# Patient Record
Sex: FEMALE | Race: WHITE | NOT HISPANIC OR LATINO | Employment: UNEMPLOYED | URBAN - METROPOLITAN AREA
[De-identification: names, ages, dates, MRNs, and addresses within clinical notes are randomized per-mention and may not be internally consistent; named-entity substitution may affect disease eponyms.]

---

## 2018-09-05 ENCOUNTER — NON-PROVIDER VISIT (OUTPATIENT)
Dept: NEUROLOGY | Facility: MEDICAL CENTER | Age: 33
End: 2018-09-05
Payer: MEDICAID

## 2018-09-05 DIAGNOSIS — G40.909 NONINTRACTABLE EPILEPSY WITHOUT STATUS EPILEPTICUS, UNSPECIFIED EPILEPSY TYPE (HCC): ICD-10-CM

## 2018-09-05 PROCEDURE — 95816 EEG AWAKE AND DROWSY: CPT | Performed by: PSYCHIATRY & NEUROLOGY

## 2018-09-10 NOTE — PROCEDURES
DATE OF SERVICE:  09/05/2018    This is an outpatient EEG, done on 09/05/2018.    ROUTINE ELECTROENCEPHALOGRAM REPORT        NAME: Nenita Gomesyl     REFERRING Dr: Ingris     DURATION: 25 minutes     INDICATION: EEG for pt who reports has a seizure disorder since 18 years of age but well controlled until more recently experiencing events of convulsing and LOC duirng menstrual cycle. Pt also reports daily body twitches.   HX seizure , HTN        TECHNIQUE: 30 channel routine electroencephalogram (EEG) was performed in accordance with the international 10-20 system. The study was reviewed in bipolar and referential montages. The recording examined the patient during wakeful and drowsy state(s).      DESCRIPTION OF THE RECORD:        Background rhythm during awake stage shows well-organized, well-developed, average voltage 10 to 11 hertz alpha activity in the posterior regions.  It blocks with eye opening and it is bilaterally synchronous and symmetrical.  No spike-and-wave discharges but short runs of delta lateralizing abnormalities over left are seen.  Photic stimulation and HV did not produce any abnormalities. No abnormalities were found during the procedure. Stage I sleep was achieved.        ACTIVATION PROCEDURES:       HV and Photic Stimulation were done     ICTAL AND/OR INTERICTAL FINDINGS:    No focal or generalized epileptiform activity noted. No regional slowing was seen during this routine study.  No clinical events or seizures were reported or recorded during the study.       EKG: sampling of the EKG recording demonstrated sinus rhythm.          INTERPRETATION:        ________________________________________________________________________     This is mildly abnormal routine EEG recording in the awake and drowsy/sleep state(s).     This scalp EEG shows mild focal cortical dysfunction / irritabiltiy over left temporal     Of note, unremarkable EEG does not completely exclude the diagnosis  of seizures  since seizure is an episodic phenomena and frontal lobe seizures could have normal scalp EEG. Clinical correlation may help     If clinical suspicion of seizure remains high.  Prolonged outpatient EEG   monitoring may be of help.        ________________________________________________________________________                  ____________________________________     YEN-MD ANA ACEVES / BRIGIDO    DD:  09/09/2018 23:10:53  DT:  09/09/2018 23:16:47    D#:  [id number]  Job#:  [id number]

## 2018-09-10 NOTE — PROGRESS NOTES
ROUTINE ELECTROENCEPHALOGRAM REPORT      NAME: Beata Gomes    REFERRING Dr: Ingris    DURATION: 25 minutes    INDICATION: EEG for pt who reports has a seizure disorder since 18 years of age but well controlled until more recently experiencing events of convulsing and LOC duirng menstrual cycle. Pt also reports daily body twitches.   HX seizure , HTN      TECHNIQUE: 30 channel routine electroencephalogram (EEG) was performed in accordance with the international 10-20 system. The study was reviewed in bipolar and referential montages. The recording examined the patient during wakeful and drowsy state(s).     DESCRIPTION OF THE RECORD:      Background rhythm during awake stage shows well-organized, well-developed, average voltage 10 to 11 hertz alpha activity in the posterior regions.  It blocks with eye opening and it is bilaterally synchronous and symmetrical.  No spike-and-wave discharges but short runs of delta lateralizing abnormalities over left are seen.  Photic stimulation and HV did not produce any abnormalities. No abnormalities were found during the procedure. Stage I sleep was achieved.      ACTIVATION PROCEDURES:      HV and Photic Stimulation were done    ICTAL AND/OR INTERICTAL FINDINGS:    No focal or generalized epileptiform activity noted. No regional slowing was seen during this routine study.  No clinical events or seizures were reported or recorded during the study.      EKG: sampling of the EKG recording demonstrated sinus rhythm.        INTERPRETATION:      ________________________________________________________________________    This is mildly abnormal routine EEG recording in the awake and drowsy/sleep state(s).    This scalp EEG shows mild focal cortical dysfunction / irritabiltiy over left temporal    Of note, unremarkable EEG does not completely exclude the diagnosis  of seizures since seizure is an episodic phenomena and frontal lobe seizures could have normal scalp EEG. Clinical  correlation may help     If clinical suspicion of seizure remains high.  Prolonged outpatient EEG   monitoring may be of help.      ________________________________________________________________________

## 2019-01-09 ENCOUNTER — OFFICE VISIT (OUTPATIENT)
Dept: NEUROLOGY | Facility: MEDICAL CENTER | Age: 34
End: 2019-01-09
Payer: MEDICAID

## 2019-01-09 VITALS
WEIGHT: 234.8 LBS | BODY MASS INDEX: 35.58 KG/M2 | SYSTOLIC BLOOD PRESSURE: 124 MMHG | RESPIRATION RATE: 18 BRPM | HEIGHT: 68 IN | TEMPERATURE: 98.3 F | OXYGEN SATURATION: 96 % | HEART RATE: 89 BPM | DIASTOLIC BLOOD PRESSURE: 80 MMHG

## 2019-01-09 DIAGNOSIS — G40.409 MYOCLONIC SEIZURE DISORDER (HCC): ICD-10-CM

## 2019-01-09 DIAGNOSIS — Z91.89 AT RISK FOR OSTEOPENIA: ICD-10-CM

## 2019-01-09 DIAGNOSIS — F32.A DEPRESSION, UNSPECIFIED DEPRESSION TYPE: ICD-10-CM

## 2019-01-09 DIAGNOSIS — F17.200 SMOKER: ICD-10-CM

## 2019-01-09 PROCEDURE — 99205 OFFICE O/P NEW HI 60 MIN: CPT | Performed by: NURSE PRACTITIONER

## 2019-01-09 RX ORDER — DIVALPROEX SODIUM 500 MG/1
500 TABLET, DELAYED RELEASE ORAL 2 TIMES DAILY
Qty: 60 TAB | Refills: 5 | Status: SHIPPED | OUTPATIENT
Start: 2019-01-09 | End: 2019-06-19 | Stop reason: SDUPTHER

## 2019-01-09 RX ORDER — CARBAMAZEPINE 400 MG/1
400 TABLET, EXTENDED RELEASE ORAL 3 TIMES DAILY
COMMUNITY
Start: 2018-12-02 | End: 2019-01-09 | Stop reason: SDUPTHER

## 2019-01-09 RX ORDER — LORAZEPAM 1 MG/1
TABLET ORAL
Qty: 20 TAB | Refills: 0 | Status: SHIPPED | OUTPATIENT
Start: 2019-01-09 | End: 2019-07-09

## 2019-01-09 RX ORDER — CARBAMAZEPINE 400 MG/1
400 TABLET, EXTENDED RELEASE ORAL 3 TIMES DAILY
Qty: 90 TAB | Refills: 0 | Status: SHIPPED | OUTPATIENT
Start: 2019-01-09 | End: 2019-06-19

## 2019-01-09 RX ORDER — BUPROPION HYDROCHLORIDE 75 MG/1
75 TABLET ORAL DAILY
COMMUNITY
Start: 2018-12-24 | End: 2019-06-19

## 2019-01-09 ASSESSMENT — PATIENT HEALTH QUESTIONNAIRE - PHQ9
CLINICAL INTERPRETATION OF PHQ2 SCORE: 3
5. POOR APPETITE OR OVEREATING: 3 - NEARLY EVERY DAY
SUM OF ALL RESPONSES TO PHQ QUESTIONS 1-9: 15

## 2019-01-09 NOTE — PATIENT INSTRUCTIONS
Depakote 500mg tablets      AM            PM   0 tablet -- 1/2 tablet X 5 days   0 tablet -- 1 tablet X 5 days  1/2 tablet- 1 tablet X 1 week    1 tablet - 1 tablet thereafter      Carbamezapine XR 400mg tablet   AM        Afternoon     Bedtime   1 tab -- 1/2 tablet -- 1 tablet X 5 days   1 tab --   0            -- 1 tablet X 5 days  1/2 tab-   0             -- 1 tablet X 1 week     0---------------------- 1 tablet X 1 week     0---------------------- 1/2 tab X 1 week    Then  Stop.    Start prenatal vitamin daily.

## 2019-01-09 NOTE — PROGRESS NOTES
Subjective:      Beata Gomes is a 33 y.o. female who presents with New Patient (Seizures)        Referred by PCP to our neurology office.    Last seen in childhood per neurology.    HPI   History of first jerks at age 18.  Was pouring cereal into a bowl and threw the bowl over her head.  About 4 months later, she had a GTC.     Referred to Dr Morgan, started a different AED-- she knows that her jerks got worse and really no side effects.    Drop out from high school due to the seizures, now has a GED now.    Good is having jerks every day without a convulsion.    Has a very sporadic work history due to seizures.    Had a very difficult few years with drinking and depressed.  First baby at age 23-- fetal demise at 36 weeks.  Second child at age 29, baby girl.    Last job was at the [employer].    Last known convulsion was in October 2018, morning hours.    Triggers: stress, poor sleep, menstrual period.  Typically has jerks in the morning when has poor sleep.  If she doesn't nap, in the afternoon, she will jerk a lot in the evening.    Lives in [place], [place] with parents.  Mother with genetic based epilepsy.    [place].  Does not work or drive.    Wellbutrin-- quit smoking for 3 months then resumed.    9/2018: EEG per Dr Persaud  This is mildly abnormal routine EEG recording in the awake and drowsy/sleep state(s).     This scalp EEG shows mild focal cortical dysfunction / irritabiltiy over left temporal    Current Outpatient Prescriptions   Medication Sig Dispense Refill   • carBAMazepine SR (TEGRETOL XR) 400 MG TABLET SR 12 HR Take 400 mg by mouth 3 times a day.     • metoprolol (LOPRESSOR) 25 MG Tab Take 25 mg by mouth 2 Times a Day.     • buPROPion (WELLBUTRIN) 75 MG Tab Take 75 mg by mouth every day.     • Prenatal Vit-FePoly-FA-DHA 27-1-200 MG CAPS Take 1 Cap by mouth every day. (Patient not taking: Reported on 1/9/2019) 30 Cap 8   • metronidazole (FLAGYL) 500 MG TABS Take 1 Tab by mouth 2 Times a Day. (Patient not  "taking: Reported on 1/9/2019) 14 Tab 0   • Prenatal MV-Min-Fe Fum-FA-DHA (PRENATAL 1 PO) Take  by mouth.     • nitrofurantoin monohydr macro (MACROBID) 100 MG CAPS Take 1 Cap by mouth 2 times a day. (Patient not taking: Reported on 1/9/2019) 20 Cap 0     No current facility-administered medications for this visit.        Review of Systems   Constitutional: Negative.    HENT: Negative for hearing loss, nosebleeds and sore throat.         No recent head injury.   Eyes: Negative for double vision.        No new loss of vision.   Respiratory: Negative for cough.         No recent lung infections.   Cardiovascular: Negative for chest pain.   Gastrointestinal: Negative for abdominal pain, diarrhea, nausea and vomiting.   Genitourinary: Negative.    Musculoskeletal: Negative.    Skin: Negative.    Neurological: Positive for seizures. Negative for headaches.   Endo/Heme/Allergies:        No history of endocrine dysfunction.  No new problems.   Psychiatric/Behavioral: Negative for depression. The patient is not nervous/anxious.         No recent mood changes.          Objective:     /80   Pulse 89   Temp 36.8 °C (98.3 °F)   Resp 18   Ht 1.727 m (5' 8\")   Wt 106.5 kg (234 lb 12.8 oz)   SpO2 96%   BMI 35.70 kg/m²      Physical Exam   Constitutional: She is oriented to person, place, and time. She appears well-developed and well-nourished. No distress.   HENT:   Head: Normocephalic and atraumatic.   Nose: Nose normal.   Eyes: Pupils are equal, round, and reactive to light.   Neck: Normal range of motion.   Cardiovascular: Normal rate and regular rhythm.  Exam reveals no gallop and no friction rub.    No murmur heard.  Pulmonary/Chest: Effort normal and breath sounds normal. No respiratory distress.   Lymphadenopathy:     She has no cervical adenopathy.   Neurological: She is alert and oriented to person, place, and time. Gait normal.   CN II: Fundi normal, visual fields full to confrontation.  CN III, IV, VI: " Pupils equal, round, and reactive to light.  Extraocular movements full and intact in horizontal and vertical gaze.  CN V: Normal in motor and sensory modalities.  CN VII: No evidence of facial asymmetry.  CN VIII: Hearing grossly intact.  CN IX, X: Palate elevates symmetrically and in the midline.  CN XI: Normal sternocleidomastoid strength.  CN XII: Tongue is in the midline.    Motor: Normal muscle bulk and tone, with full and symmetric strength.  Sensory: Intact to light touch, pinprick, vibration, proprioception, and graphesthesia.  DTR's: 2+ throughout with flexor plantar responses.  Cerebellar/Coordination: Normal finger to finger, finger-tapping, rapid alternating movements, and foot tapping.  Gait: Normal casual gait.  Walks well on heels and toes, as well as in tandem gait.   Skin: Skin is warm and dry.   Psychiatric: She has a normal mood and affect.             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    Prescribed CBZ for many years.    History of mood disorder, depression, treated with Wellbutrin currently.    Jerks primarily triggered by lack of sleep.  Last concern for GTC was October 2018.    Neurological examination is normal and non-focal.    I have multiple concerns for Ms Gomes at this time    Carbamezapine may in fact be worsening her epilepsy.  AED of choice is Depakote followed by Keppra at this time.  Lengthy conversation regarding side effects potentiated with Depakote.  She has an IUD in place.  Struggle with depression and anxiety.     Discussed the unlikely possibility of liver failure.  We discussed the possibility of thrombocytopenia, weight gain, and mood alteration.  And the need for monthly labs for the first 6 months on Depakote.     Depakote 500mg tablets      AM            PM   0 tablet -- 1/2 tablet X 5 days   0 tablet -- 1 tablet X 5 days  1/2 tablet- 1 tablet X 1 week    1 tablet - 1 tablet  thereafter      Carbamezapine XR 400mg tablet   AM        Afternoon     Bedtime   1 tab -- 1/2 tablet -- 1 tablet X 5 days   1 tab --   0            -- 1 tablet X 5 days  1/2 tab-   0             -- 1 tablet X 1 week     0---------------------- 1 tablet X 1 week     0---------------------- 1/2 tab X 1 week  Then Stop.    Start prenatal vitamin daily.  Must always use 2 forms of birth control while on Depakote.    I am concerned about the choice of Wellbutrin- it was initiated with the goal to curb her nicotine craving which it has not.  I ask them to consider her weaning off of the Wellbutrin per the ordering physician instruction.    New Rx for ativan 1mg tablet prescribed and discussed appropriate usage thereof.    Discussed general safety at length, she does not drive but would very much like to in the future.  Reviewed water and cooking safety.    Obtain 24-hour aEEG to evaluate for subclinical seizures.    Discussed DEXA and/or MRI in the future.    To stay in close telephone contact.    Return for follow-up after EEG to discuss results.   I spent 60+ minutes with this patient, over fifty percent was spent counseling patient on their condition, best management practices, reviewing test results and risks and benefits of treatment.      EDUCATION AND COUNSELING:  -Education was provided to the patient and/or family regarding diagnosis and prognosis. The chronic and unpredictable nature of the condition was discussed. There is increased risk for additional events, which may carry potential for significant injuries and death.    -We reviewed the current antiepileptic regimen. Potential side effects of antiepileptics were discussed at length, including but no limited to: hypersensitivity reactions (rash and others, some of which can be fatal), visual field changes (some of which may be irreversible), glaucoma, diplopia, kidney stones, osteopenia/osteoporosis/bone fractures, hyperthermia/anhydrosis, tremors, ataxia,  dizziness, fatigue, increased risk for falls, cardiac arrhythmias/syncope, gastrointestinal (hepatitis, pancreatitis, gastritis, ulcers), gingival hypertrophy, drowsiness, sedation, anxiety/nervousness, increased risk for suicide, increased risk for depression, and psychosis. We reviewed drug-drug interactions and their potential effect on seizure control and medication side effects.    -Patient/family educated on SUDEP (Sudden Death in Epilepsy). Counseling was provided on the importance of strict medication and follow up compliance. The patient understands the risks associated with non-adherence with the medical plan as outlined, including but not limited to an increased risk for breakthrough seizures, which may contribute to injuries, disability, status epilepticus, and even death.    -Counseling was also provided on potential effects of alcohol and other drugs, which may lower seizure threshold and/or affect the metabolism of antiepileptic drugs. I recommend avoidance of alcohol and illegal drugs.  -Recommend proper hydration, regular exercise, proper sleep hygiene (7-8 hrs of overnight sleep, avoid sleep deprivation).    -I have made the patient aware of mandatory reporting required by the law in the State of [place] regarding episodes of seizures, loss of consciousness, and/or alteration of awareness. The patient and family are responsible for reporting events to the DMV, instructions were provided. The patient verbalized understanding and states he has not been driving. Other seizure precautions were discussed at length, including no diving, no skydiving, no unsupervised swimming, no Jacuzzi or bathing in bathtubs.    Pt agrees with plan.

## 2019-01-11 ENCOUNTER — TELEPHONE (OUTPATIENT)
Dept: NEUROLOGY | Facility: MEDICAL CENTER | Age: 34
End: 2019-01-11

## 2019-01-11 DIAGNOSIS — G40.909 SEIZURE DISORDER (HCC): ICD-10-CM

## 2019-01-11 NOTE — TELEPHONE ENCOUNTER
Please put an addendum for patient's EEG order, needs to say 24 hour. EEG scheduling is unable to do so on their end. Thanks,

## 2019-01-14 PROBLEM — F32.A DEPRESSION: Status: ACTIVE | Noted: 2019-01-14

## 2019-01-14 PROBLEM — Z91.89 AT RISK FOR OSTEOPENIA: Status: ACTIVE | Noted: 2019-01-14

## 2019-01-14 PROBLEM — G40.409 MYOCLONIC SEIZURE DISORDER (HCC): Status: ACTIVE | Noted: 2019-01-14

## 2019-01-14 ASSESSMENT — ENCOUNTER SYMPTOMS
SORE THROAT: 0
DIARRHEA: 0
HEADACHES: 0
COUGH: 0
SEIZURES: 1
DEPRESSION: 0
MUSCULOSKELETAL NEGATIVE: 1
ABDOMINAL PAIN: 0
CONSTITUTIONAL NEGATIVE: 1
DOUBLE VISION: 0
VOMITING: 0
NERVOUS/ANXIOUS: 0
NAUSEA: 0

## 2019-04-17 ENCOUNTER — TELEPHONE (OUTPATIENT)
Dept: NEUROLOGY | Facility: MEDICAL CENTER | Age: 34
End: 2019-04-17

## 2019-04-17 NOTE — TELEPHONE ENCOUNTER
Please let Beata know that her Vit D level is low-- recommend taking Vit D 5000IU per day.      Also, how are her seizures?

## 2019-05-03 NOTE — TELEPHONE ENCOUNTER
[place] back from patient today she understood to add Vitamin D to her daily medications.     She stated that her seizures are much better and is feeling good.     Confirmed appt for her AEEG on May 22.

## 2019-05-23 ENCOUNTER — NON-PROVIDER VISIT (OUTPATIENT)
Dept: NEUROLOGY | Facility: MEDICAL CENTER | Age: 34
End: 2019-05-23
Payer: MEDICAID

## 2019-05-23 DIAGNOSIS — G40.909 SEIZURE DISORDER (HCC): ICD-10-CM

## 2019-05-23 PROCEDURE — 95816 EEG AWAKE AND DROWSY: CPT | Performed by: PSYCHIATRY & NEUROLOGY

## 2019-06-19 ENCOUNTER — OFFICE VISIT (OUTPATIENT)
Dept: NEUROLOGY | Facility: MEDICAL CENTER | Age: 34
End: 2019-06-19
Payer: MEDICAID

## 2019-06-19 VITALS
DIASTOLIC BLOOD PRESSURE: 72 MMHG | HEART RATE: 76 BPM | TEMPERATURE: 97.9 F | SYSTOLIC BLOOD PRESSURE: 124 MMHG | WEIGHT: 273 LBS | HEIGHT: 68 IN | BODY MASS INDEX: 41.37 KG/M2 | OXYGEN SATURATION: 97 %

## 2019-06-19 DIAGNOSIS — Z91.89 AT RISK FOR OSTEOPENIA: ICD-10-CM

## 2019-06-19 DIAGNOSIS — F32.9 REACTIVE DEPRESSION: ICD-10-CM

## 2019-06-19 DIAGNOSIS — G40.409 MYOCLONIC SEIZURE DISORDER (HCC): ICD-10-CM

## 2019-06-19 PROCEDURE — 99214 OFFICE O/P EST MOD 30 MIN: CPT | Performed by: NURSE PRACTITIONER

## 2019-06-19 RX ORDER — DIVALPROEX SODIUM 500 MG/1
500 TABLET, DELAYED RELEASE ORAL 2 TIMES DAILY
Qty: 60 TAB | Refills: 5 | Status: SHIPPED | OUTPATIENT
Start: 2019-06-19 | End: 2019-12-30

## 2019-06-19 ASSESSMENT — ENCOUNTER SYMPTOMS
DEPRESSION: 1
NAUSEA: 0
CONSTITUTIONAL NEGATIVE: 1
COUGH: 0
DIARRHEA: 0
SEIZURES: 0
SORE THROAT: 0
ABDOMINAL PAIN: 0
HEADACHES: 0
VOMITING: 0
MUSCULOSKELETAL NEGATIVE: 1
DOUBLE VISION: 0
NERVOUS/ANXIOUS: 0

## 2019-06-24 ENCOUNTER — TELEPHONE (OUTPATIENT)
Dept: NEUROLOGY | Facility: MEDICAL CENTER | Age: 34
End: 2019-06-24

## 2019-06-24 DIAGNOSIS — G40.909 SEIZURE DISORDER (HCC): ICD-10-CM

## 2019-06-24 DIAGNOSIS — R21 RASH IN ADULT: ICD-10-CM

## 2019-06-24 NOTE — TELEPHONE ENCOUNTER
Patient started having a rash about 2 1/2 months ago on her stomach and she believes it is from the dilantin. She said that it flares up gets red and peels, she takes benadryl for it but is wondering I there is anything else she can do or use for it as it is really becoming bothersome for her.     Please advise.

## 2019-06-28 NOTE — TELEPHONE ENCOUNTER
Called patient and LVM stating to see PCP urgently and to have labs done, fasting. Asked for her to call back to see if she would like labs faxed to a lab closer to her.

## 2019-07-25 ENCOUNTER — TELEPHONE (OUTPATIENT)
Dept: NEUROLOGY | Facility: MEDICAL CENTER | Age: 34
End: 2019-07-25

## 2019-07-29 ENCOUNTER — TELEPHONE (OUTPATIENT)
Dept: NEUROLOGY | Facility: MEDICAL CENTER | Age: 34
End: 2019-07-29

## 2019-12-30 ENCOUNTER — APPOINTMENT (OUTPATIENT)
Dept: NEUROLOGY | Facility: MEDICAL CENTER | Age: 34
End: 2019-12-30
Payer: MEDICAID

## 2020-01-29 ENCOUNTER — OFFICE VISIT (OUTPATIENT)
Dept: NEUROLOGY | Facility: MEDICAL CENTER | Age: 35
End: 2020-01-29
Payer: MEDICAID

## 2020-01-29 VITALS
HEART RATE: 75 BPM | SYSTOLIC BLOOD PRESSURE: 122 MMHG | TEMPERATURE: 98.4 F | WEIGHT: 263 LBS | HEIGHT: 68 IN | BODY MASS INDEX: 39.86 KG/M2 | DIASTOLIC BLOOD PRESSURE: 78 MMHG | RESPIRATION RATE: 16 BRPM | OXYGEN SATURATION: 98 %

## 2020-01-29 DIAGNOSIS — F32.9 REACTIVE DEPRESSION: ICD-10-CM

## 2020-01-29 DIAGNOSIS — Z91.89 AT RISK FOR OSTEOPENIA: ICD-10-CM

## 2020-01-29 DIAGNOSIS — G40.409 MYOCLONIC SEIZURE DISORDER (HCC): ICD-10-CM

## 2020-01-29 PROCEDURE — 99213 OFFICE O/P EST LOW 20 MIN: CPT | Performed by: NURSE PRACTITIONER

## 2020-01-29 RX ORDER — VITAMIN A, VITAMIN C, VITAMIN D, VITAMIN E, THIAMINE, RIBOFLAVIN, NIACIN, VITAMIN B6, FOLIC ACID, VITAMIN B12, CALCIUM, IRON, ZINC, COPPER 4000; 120; 400; 22; 1.84; 3; 20; 10; 1; 12; 200; 27; 25; 2 [IU]/1; MG/1; [IU]/1; [IU]/1; MG/1; MG/1; MG/1; MG/1; MG/1; UG/1; MG/1; MG/1; MG/1; MG/1
1 TABLET ORAL
COMMUNITY
Start: 2020-01-08 | End: 2021-03-15

## 2020-01-29 RX ORDER — DIVALPROEX SODIUM 500 MG/1
TABLET, DELAYED RELEASE ORAL
Qty: 60 TAB | Refills: 3 | Status: SHIPPED | OUTPATIENT
Start: 2020-01-29 | End: 2020-06-05

## 2020-01-29 ASSESSMENT — ENCOUNTER SYMPTOMS
DEPRESSION: 0
SEIZURES: 0
HEADACHES: 0
DIARRHEA: 0
COUGH: 0
NERVOUS/ANXIOUS: 0
VOMITING: 0
ABDOMINAL PAIN: 0
DOUBLE VISION: 0
NAUSEA: 0
MUSCULOSKELETAL NEGATIVE: 1
SORE THROAT: 0

## 2020-01-29 NOTE — PROGRESS NOTES
Subjective:      Beata Gomes is a 34 y.o. female who presents with Follow-Up (Myoclonic seizure disorder )          HPI   She is doing very well and has had the same job for about 4 months now.  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     Tapered off the Carbamezapine with last dose was mid-2019.  Up to the full dose of Depakote.     Did have spells of bilateral arm jerks that have not been noticed since the end of February.     2019 INTERPRETATION:  This is a normal routine EEG recording in the awake state.    Clinical correlation is recommended.    She has had some instruction to transition onto a keto based diet.     2018 INTERPRETATION:   This is mildly abnormal routine EEG recording in the awake and drowsy/sleep state(s).     This scalp EEG shows mild focal cortical dysfunction / irritabiltiy over left temporal.     Of note, unremarkable EEG does not completely exclude the diagnosis of seizures since seizure is an episodic phenomena and frontal lobe seizures could have normal scalp EEG. Clinical correlation may help.     If clinical suspicion of seizure remains high.  Prolonged outpatient EEG monitoring may be of help.     Seasonale birth control-- menses every 3 months.    Current Outpatient Medications   Medication Sig Dispense Refill   • Prenatal Vit-Fe Fumarate-FA (M- PLUS) 27-1 MG Tab Take 1 Tab by mouth.     • vitamin D (CHOLECALCIFEROL) 1000 UNIT Tab Take 1,000 Units by mouth every day.     • divalproex (DEPAKOTE) 500 MG Tablet Delayed Response TAKE 1 TABLET BY MOUTH TWICE DAILY 60 Tab 0   • metoprolol (LOPRESSOR) 25 MG Tab Take 25 mg by mouth 2 Times a Day.       No current facility-administered medications for this visit.          Review of Systems   Constitutional: Positive for weight loss (10# intercurrently).   HENT: Negative for hearing loss, nosebleeds and sore throat.         No recent head injury.   Eyes: Negative for double vision.        No  "new loss of vision.   Respiratory: Negative for cough.         No recent lung infections.   Cardiovascular: Negative for chest pain.   Gastrointestinal: Negative for abdominal pain, diarrhea, nausea and vomiting.   Genitourinary: Negative.    Musculoskeletal: Negative.    Skin: Negative.    Neurological: Negative for seizures and headaches.   Endo/Heme/Allergies:        No history of endocrine dysfunction.  No new problems.   Psychiatric/Behavioral: Negative for depression. The patient is not nervous/anxious.         No recent mood changes.          Objective:     /78 (BP Location: Left arm, Patient Position: Sitting, BP Cuff Size: Adult)   Pulse 75   Temp 36.9 °C (98.4 °F) (Temporal)   Resp 16   Ht 1.727 m (5' 7.99\")   Wt 119.3 kg (263 lb)   SpO2 98%   BMI 40.00 kg/m²      Physical Exam  Constitutional:       Appearance: She is well-developed.   HENT:      Head: Normocephalic and atraumatic.      Mouth/Throat:      Mouth: Mucous membranes are moist.   Eyes:      Pupils: Pupils are equal, round, and reactive to light.   Neck:      Musculoskeletal: Normal range of motion.   Cardiovascular:      Rate and Rhythm: Normal rate and regular rhythm.   Pulmonary:      Effort: Pulmonary effort is normal.   Musculoskeletal: Normal range of motion.   Skin:     General: Skin is warm.   Neurological:      Mental Status: She is alert and oriented to person, place, and time.      Motor: No abnormal muscle tone.      Gait: Gait normal.      Comments: No observable changes in neurologic status.  See initial new patient examination for details.    Psychiatric:         Mood and Affect: Mood normal.             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    Has tapered off of carbamezipine and onto valproic acid and is doing very well.       Last myoclonic jerks were in February 2019.  She feels much better in general.  History of mood disorder, " depression, and no longer treated with Wellbutrin.     Jerks primarily triggered by lack of sleep.  Last concern for GTC was October 2018.     History of placing a referral to psychiatry for evaluation and treatment of depression.     Counseled regarding her weight gain and lack of satiety with Depakote.     Continue VPA 500mg BID.    Continue PNV and folic acid daily.     Return for follow-up in 6 months.        EDUCATION AND COUNSELING:  -Education was provided to the patient and/or family regarding diagnosis and prognosis. The chronic and unpredictable nature of the condition was discussed. There is increased risk for additional events, which may carry potential for significant injuries and death.    -We reviewed the current antiepileptic regimen. Potential side effects of antiepileptics were discussed at length, including but no limited to: hypersensitivity reactions (rash and others, some of which can be fatal), visual field changes (some of which may be irreversible), glaucoma, diplopia, kidney stones, osteopenia/osteoporosis/bone fractures, hyperthermia/anhydrosis, tremors, ataxia, dizziness, fatigue, increased risk for falls, cardiac arrhythmias/syncope, gastrointestinal (hepatitis, pancreatitis, gastritis, ulcers), gingival hypertrophy, drowsiness, sedation, anxiety/nervousness, increased risk for suicide, increased risk for depression, and psychosis. We reviewed drug-drug interactions and their potential effect on seizure control and medication side effects.    -Patient/family educated on SUDEP (Sudden Death in Epilepsy). Counseling was provided on the importance of strict medication and follow up compliance. The patient understands the risks associated with non-adherence with the medical plan as outlined, including but not limited to an increased risk for breakthrough seizures, which may contribute to injuries, disability, status epilepticus, and even death.    -Counseling was also provided on potential  effects of alcohol and other drugs, which may lower seizure threshold and/or affect the metabolism of antiepileptic drugs. I recommend avoidance of alcohol and illegal drugs.  -Recommend proper hydration, regular exercise, proper sleep hygiene (7-8 hrs of overnight sleep, avoid sleep deprivation).    -I have made the patient aware of mandatory reporting required by the law in the State [facility] regarding episodes of seizures, loss of consciousness, and/or alteration of awareness. The patient and family are responsible for reporting events to the DMV, instructions were provided. The patient verbalized understanding and states he has not been driving. Other seizure precautions were discussed at length, including no diving, no skydiving, no unsupervised swimming, no Jacuzzi or bathing in bathtubs.    -He is ok to return to work but cannot operate any heavy machinery, he verbalized understanding.      Pt agrees with plan.

## 2020-02-03 ASSESSMENT — ENCOUNTER SYMPTOMS: WEIGHT LOSS: 1

## 2020-06-08 ENCOUNTER — TELEPHONE (OUTPATIENT)
Dept: NEUROLOGY | Facility: MEDICAL CENTER | Age: 35
End: 2020-06-08

## 2020-08-12 ENCOUNTER — OFFICE VISIT (OUTPATIENT)
Dept: NEUROLOGY | Facility: MEDICAL CENTER | Age: 35
End: 2020-08-12
Payer: MEDICAID

## 2020-08-12 VITALS
HEART RATE: 86 BPM | HEIGHT: 68 IN | WEIGHT: 272.27 LBS | DIASTOLIC BLOOD PRESSURE: 80 MMHG | BODY MASS INDEX: 41.26 KG/M2 | SYSTOLIC BLOOD PRESSURE: 124 MMHG | TEMPERATURE: 98.6 F | OXYGEN SATURATION: 98 % | RESPIRATION RATE: 16 BRPM

## 2020-08-12 DIAGNOSIS — Z91.89 AT RISK FOR OSTEOPENIA: ICD-10-CM

## 2020-08-12 DIAGNOSIS — I10 HYPERTENSION, UNSPECIFIED TYPE: ICD-10-CM

## 2020-08-12 DIAGNOSIS — G40.409 MYOCLONIC SEIZURE DISORDER (HCC): ICD-10-CM

## 2020-08-12 DIAGNOSIS — F17.200 SMOKER: ICD-10-CM

## 2020-08-12 DIAGNOSIS — R63.5 WEIGHT GAIN: ICD-10-CM

## 2020-08-12 DIAGNOSIS — F32.9 REACTIVE DEPRESSION: ICD-10-CM

## 2020-08-12 PROCEDURE — 99213 OFFICE O/P EST LOW 20 MIN: CPT | Performed by: NURSE PRACTITIONER

## 2020-08-12 RX ORDER — DIVALPROEX SODIUM 250 MG/1
500 TABLET, EXTENDED RELEASE ORAL EVERY MORNING
Qty: 150 TAB | Refills: 5 | Status: SHIPPED | OUTPATIENT
Start: 2020-08-12 | End: 2021-02-08

## 2020-08-12 NOTE — PROGRESS NOTES
Subjective:      Beata Gomes is a 34 y.o. female who presents with Follow-Up (Myoclonic seizure disorder )          HPI She is doing very well and has had the same job for about 4 months now.  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     Tapered off the Carbamezapine with last dose was mid-2019.  Up to the full dose of Depakote.    Myoclonic jerks were well controlled for many months then with the most recent fill they have been more of an issue.  She even was brushing her hair one morning recently and her arm jerked throwing the hair brush out of her hand.     Did have spells of bilateral arm jerks that have not been noticed since the end of February.     2019 INTERPRETATION:  This is a normal routine EEG recording in the awake state.    Clinical correlation is recommended.     She has had some instruction to transition onto a keto based diet.     2018 INTERPRETATION:   This is mildly abnormal routine EEG recording in the awake and drowsy/sleep state(s).     This scalp EEG shows mild focal cortical dysfunction / irritabiltiy over left temporal.     Of note, unremarkable EEG does not completely exclude the diagnosis of seizures since seizure is an episodic phenomena and frontal lobe seizures could have normal scalp EEG. Clinical correlation may help.     If clinical suspicion of seizure remains high.  Prolonged outpatient EEG monitoring may be of help.     Seasonale birth control-- menses every 3 months.    Current Outpatient Medications   Medication Sig Dispense Refill   • divalproex ER (DEPAKOTE ER) 250 MG TABLET SR 24 HR Take 2 Tabs by mouth every morning. Take 3 Tabs by mouth every night. 150 Tab 5   • divalproex (DEPAKOTE) 500 MG Tablet Delayed Response Take 1 tablet by mouth twice daily 180 Tab 0   • Prenatal Vit-Fe Fumarate-FA (M- PLUS) 27-1 MG Tab Take 1 Tab by mouth.     • vitamin D (CHOLECALCIFEROL) 1000 UNIT Tab Take 1,000 Units by mouth every day.     •  "metoprolol (LOPRESSOR) 25 MG Tab Take 25 mg by mouth 2 Times a Day.       No current facility-administered medications for this visit.        Review of Systems   Constitutional: Positive for weight loss (weight gain).   HENT: Negative for hearing loss, nosebleeds and sore throat.         No recent head injury.   Eyes: Negative for double vision.        No new loss of vision.   Respiratory: Negative for cough.         No recent lung infections.   Cardiovascular: Negative for chest pain.   Gastrointestinal: Negative for abdominal pain, diarrhea, nausea and vomiting.   Genitourinary: Negative.    Musculoskeletal: Negative.    Skin: Negative.    Neurological: Positive for seizures. Negative for headaches.   Endo/Heme/Allergies:        No history of endocrine dysfunction.  No new problems.   Psychiatric/Behavioral: Negative for depression. The patient is not nervous/anxious.         No recent mood changes.          Objective:     /80 (BP Location: Left arm, Patient Position: Sitting, BP Cuff Size: Adult)   Pulse 86   Temp 37 °C (98.6 °F) (Temporal)   Resp 16   Ht 1.727 m (5' 7.99\")   Wt 123.5 kg (272 lb 4.3 oz)   SpO2 98%   BMI 41.41 kg/m²      Physical Exam  Constitutional:       Appearance: She is well-developed. She is obese.   HENT:      Head: Normocephalic and atraumatic.      Nose: Nose normal.   Eyes:      Pupils: Pupils are equal, round, and reactive to light.   Neck:      Musculoskeletal: Normal range of motion.   Cardiovascular:      Rate and Rhythm: Normal rate and regular rhythm.   Pulmonary:      Effort: Pulmonary effort is normal.   Musculoskeletal: Normal range of motion.   Skin:     General: Skin is warm.   Neurological:      Mental Status: She is alert and oriented to person, place, and time.      Motor: No abnormal muscle tone.      Gait: Gait normal.      Comments: No observable changes in neurologic status.  See initial new patient examination for details.    Psychiatric:         Mood and " Affect: Mood normal.             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    Has tapered off of carbamezipine and onto valproic acid and was doing very well until the last refill in which there was a generic change.  Has had an increase in jerks with this change.     Last myoclonic jerks were in February 2019.  She feels much better in general.  History of mood disorder, depression, and no longer treated with Wellbutrin.     Jerks primarily triggered by lack of sleep.  Last concern for GTC was October 2018.     History of placing a referral to psychiatry for evaluation and treatment of depression.     Counseled regarding her weight gain and lack of satiety with Depakote.     Continue Depakote with changes to extended release and dose increase from 500mg BID to 500mg-750mg.  Educated regarding this decision and the goal to be seizure-free.     Continue PNV and folic acid daily.     Obtain labs as ordered.  Counseled regarding potential side effects of Depakote.    Referral placed to PCP for care gap closure.    Return for follow-up in 4 months or sooner if needed.        EDUCATION AND COUNSELING:  -Education was provided to the patient and/or family regarding diagnosis and prognosis. The chronic and unpredictable nature of the condition was discussed. There is increased risk for additional events, which may carry potential for significant injuries and death.    -We reviewed the current antiepileptic regimen. Potential side effects of antiepileptics were discussed at length, including but no limited to: hypersensitivity reactions (rash and others, some of which can be fatal), visual field changes (some of which may be irreversible), glaucoma, diplopia, kidney stones, osteopenia/osteoporosis/bone fractures, hyperthermia/anhydrosis, tremors, ataxia, dizziness, fatigue, increased risk for falls, cardiac arrhythmias/syncope, gastrointestinal  (hepatitis, pancreatitis, gastritis, ulcers), gingival hypertrophy, drowsiness, sedation, anxiety/nervousness, increased risk for suicide, increased risk for depression, and psychosis. We reviewed drug-drug interactions and their potential effect on seizure control and medication side effects.    -Patient/family educated on SUDEP (Sudden Death in Epilepsy). Counseling was provided on the importance of strict medication and follow up compliance. The patient understands the risks associated with non-adherence with the medical plan as outlined, including but not limited to an increased risk for breakthrough seizures, which may contribute to injuries, disability, status epilepticus, and even death.    -Counseling was also provided on potential effects of alcohol and other drugs, which may lower seizure threshold and/or affect the metabolism of antiepileptic drugs. I recommend avoidance of alcohol and illegal drugs.  -Recommend proper hydration, regular exercise, proper sleep hygiene (7-8 hrs of overnight sleep, avoid sleep deprivation).    -I have made the patient aware of mandatory reporting required by the law in the State [facility] regarding episodes of seizures, loss of consciousness, and/or alteration of awareness. The patient and family are responsible for reporting events to the DMV, instructions were provided. The patient verbalized understanding and states he has not been driving. Other seizure precautions were discussed at length, including no diving, no skydiving, no unsupervised swimming, no Jacuzzi or bathing in bathtubs.    -He is ok to return to work but cannot operate any heavy machinery, he verbalized understanding.      Pt agrees with plan.

## 2020-08-18 ASSESSMENT — ENCOUNTER SYMPTOMS
SEIZURES: 1
NAUSEA: 0
WEIGHT LOSS: 1
MUSCULOSKELETAL NEGATIVE: 1
COUGH: 0
NERVOUS/ANXIOUS: 0
ABDOMINAL PAIN: 0
SORE THROAT: 0
DEPRESSION: 0
HEADACHES: 0
DIARRHEA: 0
VOMITING: 0
DOUBLE VISION: 0

## 2021-02-08 ENCOUNTER — TELEPHONE (OUTPATIENT)
Dept: NEUROLOGY | Facility: MEDICAL CENTER | Age: 36
End: 2021-02-08

## 2021-03-15 ENCOUNTER — OFFICE VISIT (OUTPATIENT)
Dept: NEUROLOGY | Facility: MEDICAL CENTER | Age: 36
End: 2021-03-15
Attending: NURSE PRACTITIONER
Payer: MEDICAID

## 2021-03-15 VITALS
SYSTOLIC BLOOD PRESSURE: 126 MMHG | DIASTOLIC BLOOD PRESSURE: 74 MMHG | WEIGHT: 273.37 LBS | BODY MASS INDEX: 41.43 KG/M2 | HEART RATE: 86 BPM | TEMPERATURE: 98.4 F | HEIGHT: 68 IN | RESPIRATION RATE: 16 BRPM | OXYGEN SATURATION: 96 %

## 2021-03-15 DIAGNOSIS — G40.909 SEIZURE DISORDER (HCC): ICD-10-CM

## 2021-03-15 DIAGNOSIS — Z91.89 AT RISK FOR OSTEOPENIA: ICD-10-CM

## 2021-03-15 DIAGNOSIS — R53.81 CHRONIC MALAISE: ICD-10-CM

## 2021-03-15 PROCEDURE — 99211 OFF/OP EST MAY X REQ PHY/QHP: CPT | Performed by: NURSE PRACTITIONER

## 2021-03-15 PROCEDURE — 99213 OFFICE O/P EST LOW 20 MIN: CPT | Performed by: NURSE PRACTITIONER

## 2021-03-15 RX ORDER — LEVONORGESTREL AND ETHINYL ESTRADIOL AND ETHINYL ESTRADIOL 150-30(84)
1 KIT ORAL DAILY
COMMUNITY
Start: 2021-01-02

## 2021-03-15 ASSESSMENT — ENCOUNTER SYMPTOMS
NERVOUS/ANXIOUS: 0
DEPRESSION: 0
COUGH: 0
HEADACHES: 0
SEIZURES: 1
ABDOMINAL PAIN: 0
NAUSEA: 0
SORE THROAT: 0
VOMITING: 0
MUSCULOSKELETAL NEGATIVE: 1
DOUBLE VISION: 0
DIARRHEA: 0

## 2021-03-15 NOTE — PROGRESS NOTES
Subjective:      Beata Gomes is a 35 y.o. female who presents with No chief complaint on file.        HPI      She is doing very well and has had the same job for almost one year now. 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     Myclonic jerks have been improved with the dose increase of Depakote after last appointment.  Notices an increased in fatigue possibly attributed to dose titration.    Myoclonic jerks are occurring rarely, typically at times with known triggers such as stress.     2019 INTERPRETATION:  This is a normal routine EEG recording in the awake state.    Clinical correlation is recommended.    She was positive for COVID in 2020.  She has had some weight gain.    2018 INTERPRETATION:   This is mildly abnormal routine EEG recording in the awake and drowsy/sleep state(s).     This scalp EEG shows mild focal cortical dysfunction / irritabiltiy over left temporal.     Of note, unremarkable EEG does not completely exclude the diagnosis of seizures since seizure is an episodic phenomena and frontal lobe seizures could have normal scalp EEG. Clinical correlation may help.  If clinical suspicion of seizure remains high.  Prolonged outpatient EEG monitoring may be of help.     Current Outpatient Medications   Medication Sig Dispense Refill   • ASHLYNA 0.15-0.03 &0.01 MG Tab Take 1 tablet by mouth every day.     • divalproex ER (DEPAKOTE ER) 250 MG TABLET SR 24 HR TAKE 2 TABLETS BY MOUTH ONCE DAILY IN THE MORNING AND  3  TABLETS  AT  NIGHT 150 Tab 3   • divalproex (DEPAKOTE) 500 MG Tablet Delayed Response Take 1 tablet by mouth twice daily 180 Tab 0   • Prenatal Vit-Fe Fumarate-FA (M-FANY PLUS) 27-1 MG Tab Take 1 Tab by mouth.     • vitamin D (CHOLECALCIFEROL) 1000 UNIT Tab Take 1,000 Units by mouth every day.     • metoprolol (LOPRESSOR) 25 MG Tab Take 25 mg by mouth 2 Times a Day.       No current facility-administered medications for this visit.         Review  of Systems   Constitutional: Positive for malaise/fatigue. Weight loss:  weight gain    HENT: Negative for hearing loss, nosebleeds and sore throat.         No recent head injury.   Eyes: Negative for double vision.        No new loss of vision.   Respiratory: Negative for cough.         No recent lung infections.   Cardiovascular: Negative for chest pain.   Gastrointestinal: Negative for abdominal pain, diarrhea, nausea and vomiting.   Genitourinary: Negative.    Musculoskeletal: Negative.    Skin: Negative.    Neurological: Positive for seizures. Negative for headaches.   Endo/Heme/Allergies:        No history of endocrine dysfunction.  No new problems.   Psychiatric/Behavioral: Negative for depression. The patient is not nervous/anxious.         No recent mood changes.          Objective:     There were no vitals taken for this visit.     Physical Exam  Constitutional:       General: She is not in acute distress.     Appearance: She is obese.      Comments: Morbidly obese   HENT:      Head: Normocephalic and atraumatic.   Eyes:      Pupils: Pupils are equal, round, and reactive to light.   Cardiovascular:      Rate and Rhythm: Normal rate and regular rhythm.   Pulmonary:      Effort: Pulmonary effort is normal. No respiratory distress.      Breath sounds: Normal breath sounds.   Musculoskeletal:         General: Normal range of motion.      Cervical back: Normal range of motion.   Skin:     General: Skin is warm and dry.   Neurological:      Mental Status: She is alert and oriented to person, place, and time.      Cranial Nerves: No cranial nerve deficit.      Motor: No abnormal muscle tone.      Coordination: Coordination normal.      Deep Tendon Reflexes: Reflexes are normal and symmetric.   Psychiatric:         Mood and Affect: Mood normal.             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    Last concern for GTC was October  2018.    Last intense myoclonic jerks were in February 2019.  She feels much better in general with titration of Valproic acid from 500mg BID to 500mg qam-750mg qhs.      History of mood disorder, depression, and no longer treated with Wellbutrin.     Jerks primarily triggered by lack of sleep.    History of placing a referral to psychiatry for evaluation and treatment of depression.     Counseled regarding her weight gain and lack of satiety with Depakote.     Continue Depakote ER 500mg-750mg.  Educated regarding this decision and the goal to be seizure-free.     Continue PNV and folic acid daily.     Obtain labs as ordered.  Counseled regarding potential side effects of Depakote.      Return for follow-up in 6 months or sooner if needed.        EDUCATION AND COUNSELING:  -Education was provided to the patient and/or family regarding diagnosis and prognosis. The chronic and unpredictable nature of the condition was discussed. There is increased risk for additional events, which may carry potential for significant injuries and death.    -We reviewed the current antiepileptic regimen. Potential side effects of antiepileptics were discussed at length, including but no limited to: hypersensitivity reactions (rash and others, some of which can be fatal), visual field changes (some of which may be irreversible), glaucoma, diplopia, kidney stones, osteopenia/osteoporosis/bone fractures, hyperthermia/anhydrosis, tremors, ataxia, dizziness, fatigue, increased risk for falls, cardiac arrhythmias/syncope, gastrointestinal (hepatitis, pancreatitis, gastritis, ulcers), gingival hypertrophy, drowsiness, sedation, anxiety/nervousness, increased risk for suicide, increased risk for depression, and psychosis. We reviewed drug-drug interactions and their potential effect on seizure control and medication side effects.    -Patient/family educated on SUDEP (Sudden Death in Epilepsy). Counseling was provided on the importance of strict  medication and follow up compliance. The patient understands the risks associated with non-adherence with the medical plan as outlined, including but not limited to an increased risk for breakthrough seizures, which may contribute to injuries, disability, status epilepticus, and even death.    -Counseling was also provided on potential effects of alcohol and other drugs, which may lower seizure threshold and/or affect the metabolism of antiepileptic drugs. I recommend avoidance of alcohol and illegal drugs.  -Recommend proper hydration, regular exercise, proper sleep hygiene (7-8 hrs of overnight sleep, avoid sleep deprivation).    -I have made the patient aware of mandatory reporting required by the law in the [facility] regarding episodes of seizures, loss of consciousness, and/or alteration of awareness. The patient and family are responsible for reporting events to the DMV, instructions were provided. The patient verbalized understanding and states he has not been driving. Other seizure precautions were discussed at length, including no diving, no skydiving, no unsupervised swimming, no Jacuzzi or bathing in bathtubs.    -He is ok to return to work but cannot operate any heavy machinery, he verbalized understanding.      Pt agrees with plan.

## 2021-06-15 NOTE — PROGRESS NOTES
Follow-up visit    Patient: Beata Gomes  : 1985    Referring Physician: No ref. provider found   Yahir Rudd D.O.    CC: seizures    IMPRESSION:    1. Seizure type and frequency of seizures- presumed JOEL with GTC  Seizure type and semiology: myoclonic jerking and GTC   Seizure frequency  Last  Seizure GTC 2018 but currenlty weekly jerking   2. Etiology/Syndrome- genetic   3. EEG findings- normal awake  4. Brain imaging n/a  5. Antiepileptic drug side effects and counseling done     6. Surgery consideration vns done   7. Safety issues counseling done   8. Reproductive, contraception, pregnancy discussion done, advised compliance    excessive daytime drowsiness and loud snore, concern for sleep apnea.   Weight gain with VPA    PLAN:  1. Myoclonic seizure disorder (HCC)  - REFERRAL TO PULMONARY AND SLEEP MEDICINE  - levETIRAcetam (KEPPRA) 500 MG Tab; Take half tablet twice a day for 1 week then 1 tab twice a day  Dispense: 180 tablet; Refill: 1  - divalproex ER (DEPAKOTE ER) 250 MG TABLET SR 24 HR; TAKE 2 TABLETS BY MOUTH ONCE DAILY IN THE MORNING AND 3 TABLETS AT NIGHT  Dispense: 150 tablet; Refill: 5    2. Insomnia, unspecified type  - REFERRAL TO PULMONARY AND SLEEP MEDICINE    3. Has daytime drowsiness  - REFERRAL TO PULMONARY AND SLEEP MEDICINE    4. Reactive depression  - divalproex ER (DEPAKOTE ER) 250 MG TABLET SR 24 HR; TAKE 2 TABLETS BY MOUTH ONCE DAILY IN THE MORNING AND 3 TABLETS AT NIGHT  Dispense: 150 tablet; Refill: 5    Other orders  - Prenatal Vit-Fe Fumarate-FA (NIVA-PLUS) 27-1 MG Tab; Take 1 tablet by mouth.  5. Surveillance labs: was recently done 2021   6. Woman within reproductive age: side effect of AEDs was discussed, no plan for future pregnancy, on Birth control   7.start low dose of LEV in lieu decreasing VPA given the excessive myoclonic jerking and side effect   8. Seizure precautions were discussed in detail with patient: she does not drive   9.. Return in 3 months or  sooner if needed.       HISTORY:    I had the opportunity to see Ms. Beata Gomes in the epilepsy clinic on 2021 for follow up on seizure disorder. she was accompanied by her daughter who provided additional history she was last seen by Myra 3/2021.     Dominant hand: right handed     In brief, her pertinent medical history is remarkable for depression and mood disorder.     Seizure type 1 - myoclonic jerking     The onset of seizure was 18, the ictal behavior was stereotyped and described as  Random jerking, in cluster, early morning, with brief LOC 1-3 seconds. Some resultant fall  Duration 1-3 seconds, frequency -4 times per week.      Seizure type 2- GTC  The onset of seizure was 18 (4 months after started to have myoclonic jerking), the ictal behavior was stereotyped and described as jerking followed by GTC.   The last seizure was morning of 2018    Seizure type 3- absence seizure  No longer active     The longest seizure free period was 3 years for GTC but weekly myoclonic jerking.   The seizures were isolated but recurred occasionally up to a few times in one day. There was    no cluster of seizures,    no history of status epilepticus    Special features:  Typically has jerks in the morning when has poor sleep.  If she doesn't nap, in the afternoon, she will jerk a lot in the evening.,     Potential triggering factors were reviewed   Sleep deprivation +   Alcohol   Stress +   Periods +   Other    Sleep is not good, she has trouble to fall asleep. She snores and still sleepy after 7 hours sleep. She sleeps alone but she arouses due to weird noise and loud snore.     Epilepsy risk factors:     -Positive: mother had seizure at age 15 and outgrew at 21. and uncle had seizure   -Negative: Normal prenatal and  course. Normal development physically and intellectually. No febrile convulsions. No history of severe head trauma. No meningitis. No stroke. No alcohol abuse. No significant  exposure to recreational drugs.  Drop out from high school due to the seizures, now has a GED now. 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    Current AEDs:  VPA  mg, 2/3     Previous workup:    1. EEG data: [place] EEG awake only 2019 RA    2. Neuroimaging data: .No valid procedures specified.    3. Recent AED level: VPA 56     Prior antiepileptic medications were reviewed  Carbamazepine (Tegretol)  and Valproate(Depakote)    Antiepileptic medications most useful:    Other therapeutic interventions:   Vagus nerve stimulation   Diet   Surgery for epilepsy   Other    I reviewed the previous medical history, surgical, family and social histories in the electronic medical record.   On review of systems, 10 of 14 systems are reviewed and found to be negative except as listed above.     Current Outpatient Medications   Medication Sig Dispense Refill   • Prenatal Vit-Fe Fumarate-FA (NIVA-PLUS) 27-1 MG Tab Take 1 tablet by mouth.     • levETIRAcetam (KEPPRA) 500 MG Tab Take half tablet twice a day for 1 week then 1 tab twice a day 180 tablet 1   • divalproex ER (DEPAKOTE ER) 250 MG TABLET SR 24 HR TAKE 2 TABLETS BY MOUTH ONCE DAILY IN THE MORNING AND 3 TABLETS AT NIGHT 150 tablet 5   • ASHLYNA 0.15-0.03 &0.01 MG Tab Take 1 tablet by mouth every day.     • vitamin D (CHOLECALCIFEROL) 1000 UNIT Tab Take 1,000 Units by mouth every day.     • metoprolol (LOPRESSOR) 25 MG Tab Take 25 mg by mouth 2 Times a Day.       No current facility-administered medications for this visit.        No Known Allergies    Past Medical History:   Diagnosis Date   • Seizure (HCC)        Social History     Socioeconomic History   • Marital status: Single     Spouse name: Not on file   • Number of children: Not on file   • Years of education: Not on file   • Highest education level: Not on file   Occupational History   • Not on file   Tobacco Use   • Smoking status: Current Every Day Smoker     Packs/day: 0.50      "Years: 10.00     Pack years: 5.00     Types: Cigarettes   • Smokeless tobacco: Never Used   Substance and Sexual Activity   • Alcohol use: No   • Drug use: No   • Sexual activity: Never     Partners: Male     Comment: None   Other Topics Concern   • Not on file   Social History Narrative   • Not on file     Social Determinants of Health     Financial Resource Strain:    • Difficulty of Paying Living Expenses:    Food Insecurity:    • Worried About Running Out of Food in the Last Year:    • Ran Out of Food in the Last Year:    Transportation Needs:    • Lack of Transportation (Medical):    • Lack of Transportation (Non-Medical):    Physical Activity:    • Days of Exercise per Week:    • Minutes of Exercise per Session:    Stress:    • Feeling of Stress :    Social Connections:    • Frequency of Communication with Friends and Family:    • Frequency of Social Gatherings with Friends and Family:    • Attends Congregation Services:    • Active Member of Clubs or Organizations:    • Attends Club or Organization Meetings:    • Marital Status:    Intimate Partner Violence:    • Fear of Current or Ex-Partner:    • Emotionally Abused:    • Physically Abused:    • Sexually Abused:        Family History   Problem Relation Age of Onset   • Diabetes Mother    • Heart Disease Maternal Grandmother         MIx2   • Cancer Paternal Grandmother         cervical cancer   • Heart Attack Paternal Grandmother          PHYSICAL EXAM:      /82 (BP Location: Right arm, Patient Position: Sitting, BP Cuff Size: Adult)   Pulse 94   Temp 36.6 °C (97.8 °F) (Temporal)   Ht 1.727 m (5' 8\")   Wt (!) 126 kg (277 lb)   SpO2 98%   BMI 42.12 kg/m²     On examination,  She appears her stated age. She has a normal, reactive affect.No apparent distress,  alert and appropriate. No labored breathing.   There is no peripheral edema. Skin: no rash or abnormalities     She gives a precise account of  her clinical symptoms. She has fluent conversational " speech, without error. No dysarthria. facial movements intact. No UE drift. I see no tremor.    Gait is normal    The total visit duration was of 45 minutes of which more than 50% was spent in coordination of care and counseling.         Saúl Horner MD  Diplomate, American Board of Psychiatry and Neurology   Diplomate, American Board of Psychiatry and Neurology in Epilepsy

## 2021-06-17 ENCOUNTER — OFFICE VISIT (OUTPATIENT)
Dept: NEUROLOGY | Facility: MEDICAL CENTER | Age: 36
End: 2021-06-17
Attending: PSYCHIATRY & NEUROLOGY
Payer: MEDICAID

## 2021-06-17 VITALS
DIASTOLIC BLOOD PRESSURE: 82 MMHG | BODY MASS INDEX: 41.98 KG/M2 | HEIGHT: 68 IN | TEMPERATURE: 97.8 F | HEART RATE: 94 BPM | OXYGEN SATURATION: 98 % | SYSTOLIC BLOOD PRESSURE: 116 MMHG | WEIGHT: 277 LBS

## 2021-06-17 DIAGNOSIS — G40.409 MYOCLONIC SEIZURE DISORDER (HCC): Primary | ICD-10-CM

## 2021-06-17 DIAGNOSIS — R40.0 HAS DAYTIME DROWSINESS: ICD-10-CM

## 2021-06-17 DIAGNOSIS — G47.00 INSOMNIA, UNSPECIFIED TYPE: ICD-10-CM

## 2021-06-17 DIAGNOSIS — F32.9 REACTIVE DEPRESSION: ICD-10-CM

## 2021-06-17 PROCEDURE — 99211 OFF/OP EST MAY X REQ PHY/QHP: CPT | Performed by: PSYCHIATRY & NEUROLOGY

## 2021-06-17 PROCEDURE — 99215 OFFICE O/P EST HI 40 MIN: CPT | Performed by: PSYCHIATRY & NEUROLOGY

## 2021-06-17 RX ORDER — DIVALPROEX SODIUM 250 MG/1
TABLET, EXTENDED RELEASE ORAL
Qty: 150 TABLET | Refills: 5 | Status: SHIPPED | OUTPATIENT
Start: 2021-06-17 | End: 2021-09-17 | Stop reason: SDUPTHER

## 2021-06-17 RX ORDER — LEVETIRACETAM 500 MG/1
TABLET ORAL
Qty: 180 TABLET | Refills: 1 | Status: SHIPPED | OUTPATIENT
Start: 2021-06-17 | End: 2021-09-17 | Stop reason: SDUPTHER

## 2021-06-17 RX ORDER — METFORMIN HYDROCHLORIDE 500 MG/1
1 TABLET, EXTENDED RELEASE ORAL
COMMUNITY
Start: 2021-05-23 | End: 2022-09-27

## 2021-06-17 NOTE — PATIENT INSTRUCTIONS
Seizure precautions    Driving    Every State restricts driving in people with seizures. [place] requires that you be seizure free for 3 months from the date of your last seizure. The Department of Motor Vehicles (DMV), not the doctor, makes the decision on driving. Exceptions may be made for seizures that do not affect mental condition and ability to control a car, or that occur 100% during sleep.. We recommend:  § Do not drive if you are having seizures that would be dangerous on the road.  § Be honest with your doctor about your seizures, even if driving may be at risk  § Be honest with the DMV (use the driving form). It may protect you legally if problems later occur.    Seizure medicines and suicide    Seizure medicines have long been known to help some people with depression, but also to make others worse. The FDA recently took a look at their database of clinical studies of people taking epilepsy medicines. Their finding was 4 suicides in 27,863 patients taking epilepsy medicines, versus none in patients taking placebo (an inactive pill). They reported 105 people of the 27,863 who did not commit suicide, but had thoughts of suicide. Combined, the risk for suicidal thoughts and behavior was about 0.4% (1 in 250) for those taking epilepsy medications and 0.2% (1 in 500) for those given placebos.    This is new and important information because doctors and patients need to know the possible side effects of medicines. But it needs to be put in perspective and certainly is no reason for panic. The 4 suicides in 27,863 is a very small percentage, and it is impossible to be sure the epilepsy drugs were the cause. Depression occurs in about 10% or more of people with epilepsy, even independent of medications. We recommend the following.  § Do not stop your seizure medicine. It could be dangerous.  § If you have symptoms of depression, such as crying and low mood, please discuss them at your clinic visits, so a  decision can be made about whether antidepressant medication or referral to a psychotherapist would be useful.  § If you are thinking seriously about suicide, please call 911.  § For most people, this FDA warning is just something to know, but not a reason to change medicines.    Bone health    Several of the older antiseizure medications may cause thinning of bones with long-term use, leading to broken bones later in life. This is most problematic with phenytoin (Dilantin), but may occur with carbamazepine (Tegretol, Carbatrol), phenobarbital, primidone (Mysoline) and possibly valproate/valproic acid (Depakote, Depakene). This is a larger concern for women in mid-life or older, but it also can be an issue for men and younger women.   § This potential problem is not an emergency and occurs over months to years; do not stop your seizure medication without discussing your concerns with your doctor.   § Calcium, vitamin D3 supplementation and regular exercise may be helpful to maintain bone health.  § Periodic bone density screening may be helpful to show existence or progression of bone thinning.     Water Safety    You could drown during a seizure that occurs in water. Use the sfilatino system for swimming. Let the keaton know that you have seizures. Take showers instead of baths.    Burn safety    If you have uncontrolled seizures, be very careful around heat or flames. Cook on the back burner - you are less likely to lean on the burner or turn over the soup during a seizure. Don’t smoke, which is good advice for other reasons as well. Set the maximum house hot water temperature to 110 degrees Fahrenheit. Put guards on open fireplaces, wood stoves or radiators.     Heights    Occasional use of ladders and going up and down stairs is a reasonable risk. If your seizures are not in control, then do not work on ladders or unprotected heights for more than brief minutes.    Equipment and Power Tools    Cutting, chopping and  drilling equipment should have safety guards to avoid inadvertent injury; otherwise, do not use it if your seizures are not fully controlled. Do not use mowers lacking automatic stop switches or chain saws.     Safety    If you have uncontrolled seizures, do not carry your child in your arms, but use one of the slings/papooses. Change the baby on the floor. Do not bathe the baby in water deep enough for the mouth to be underwater. Breastfeeding is usually considered beneficial, even though small amounts of seizure medicines come out in the breast milk.    Fall Precautions      If you fall with some of your seizures, then fall-proof your environment. Put in carpets, cover sharp corners, and consider wearing a protective helmet in some circumstances.    Sudden Unexplained Death in Epilepsy (SUDEP)    It is rare for people to die from a seizure, but it can happen. One way is trauma or a car crash from a seizure. Another is the poorly understood condition called sudden unexplained death in epilepsy (SUDEP). We think this is most likely due to heart arrhythmias (irregular beats) caused by a seizure, but the mechanism is debated. For people with uncontrolled seizures we recommend:  § Do not suddenly stop your seizure medication, since this can be a risk factor for SUDEP.  § Do not be overly worried about SUDEP. It is tragic when it happens, but it is uncommon and there are currently no preventive measures, other than the best possible seizure control.    Medication Side Effects    To be approved for prescription use, seizure medicines must pass strict safety testing. Nevertheless, they all have side effects, some of which are potentially serious or even lethal. The risks of medications must be balanced against the risks of seizures. A full discussion of possible medicine side effects is not possible here, but we recommend:  § Know the main side effects of your seizure medicines. Your doctor is the best source  for individual information. Web sites such as epilepsyfoundation.org and epilepsy.com have good information.  § The package insert provided with your prescription lists full information on side effects, but most of these will never occur in an individual. Let the package insert inform you, but not scare you. Be aware that some side effects occur from drug interactions among all your medications. Interactions can involve prescription medicines, over-the-counter medicines, herbal remedies and even some foods. Grapefruit juice is an example of a seemingly benign food that can raise levels of carbamazepine or other drugs.  § Generic medications are less expensive, but may not produce the same blood levels as do brand name drugs or even other generics. Insurance plans and pharmacies sometimes switch to generics without patient or doctor approval. Be cautious if you are switching or being switched to generics. It may work out fine (and it often is a lot less expensive), but a blood test to check levels might be useful.    Recreation    If having a seizure during a recreational activity would cause you significant harm, then do not do the activity. Use common sense. Confer with your medical team for individual restrictions. As a general guideline for starting discussion with your medical team, we recommend:  § Low-risk recreation can be done by people with seizures, even if not in control. These include walking, running, bowling, golf, baseball, basketball, soccer, volleyball, swimming with the InvenSense system, weight training with machines or spotters, elliptical trainers, treadmills with spotters. Confirm this with your medical care team.  § You should be able to go at least 3 months without a seizure to participate in medium-risk activities, but confirm this interval with your doctor. These include football, hockey, ice skating, bike racing, gymnastics, horseback riding and boating.  § You should be seizure free for more  than a year to perform high-risk activities, although some doctors recommend not engaging in high-risk recreational activities at all with a history of epilepsy. Ask yours if it is safe to engage in high-risk recreation. High-risk activities include hang gliding, motor sports, skiing, competitive skateboarding, mountain or rock climbing and SCUBA diving.

## 2021-07-14 ENCOUNTER — SLEEP CENTER VISIT (OUTPATIENT)
Dept: SLEEP MEDICINE | Facility: MEDICAL CENTER | Age: 36
End: 2021-07-14
Payer: MEDICAID

## 2021-07-14 VITALS
HEIGHT: 68 IN | OXYGEN SATURATION: 100 % | BODY MASS INDEX: 41.83 KG/M2 | DIASTOLIC BLOOD PRESSURE: 80 MMHG | HEART RATE: 60 BPM | RESPIRATION RATE: 16 BRPM | SYSTOLIC BLOOD PRESSURE: 126 MMHG | WEIGHT: 276 LBS

## 2021-07-14 DIAGNOSIS — F17.200 SMOKER: ICD-10-CM

## 2021-07-14 DIAGNOSIS — F51.04 PSYCHOPHYSIOLOGICAL INSOMNIA: ICD-10-CM

## 2021-07-14 DIAGNOSIS — G40.909 SEIZURE DISORDER (HCC): ICD-10-CM

## 2021-07-14 DIAGNOSIS — G47.19 EXCESSIVE DAYTIME SLEEPINESS: ICD-10-CM

## 2021-07-14 PROCEDURE — 99204 OFFICE O/P NEW MOD 45 MIN: CPT | Performed by: PREVENTIVE MEDICINE

## 2021-07-14 RX ORDER — ZOLPIDEM TARTRATE 5 MG/1
5 TABLET ORAL NIGHTLY PRN
Qty: 2 TABLET | Refills: 0 | Status: SHIPPED | OUTPATIENT
Start: 2021-07-14 | End: 2022-05-09

## 2021-07-14 NOTE — PROGRESS NOTES
"  CC: \"I just do not have a bedtime schedule.\"    HPI:  Beata Gomes is a 35 y.o. female kindly referred by Yahir Rudd D.O..  Today she is accompanied by her father.  This patient has a history of seizures and the seizure type and semiology is 1 of myoclonic jerking.  She is also had generalized tonic seizures.  She also has depression which may be a result of the chronic Depakote administration.  Additionally she has been diagnosed with episodes of seizure.    Sleep history:    This patient reports that she has trouble with sleep onset, snoring, poor sleep quality and daytime fatigue.  She reports the symptoms started in 2019 when she was started on a new medication for epilepsy that medication is Depakote.  She also reports difficulties because of very vivid dreams that are sometimes frightening.  She also has significant problems concentrating as result of poor sleep as well as lack of interest in sexual behavior.  She reports that she is frequently irritable depressed and anxious especially when she has a very poor night sleep.  And she reports that she grinds her teeth.  In terms of exposures this patient continues to smoke 1/2 pack/day and has no interest in stopping.  She does not drink alcohol.  She does drink 3 or 4 caffeinated beverages a day and she works part-time as a .  She has a 6-year-old daughter who sleeps with her in bed    Symptom Summary:  Snoring: +  Very loud snoring: +  Witnessed apneas: +  Resuscitative snorts: +  Nocturnal shortness of breath: +  Non-restorative sleep: +  EPWORTH SCORE:    21  /24, this is excessive daytime sleepiness  Insomnia: +  Nocturnal awakenings: +  Nocturia: +  EDS: +  Fatigue: +  Tiredness: +  Falls asleep accidentally: +   Napping or returning to bed after arising: +  Restless legs: -  Limb movements during sleep: -  Nocturnal headaches: -  Morning Headaches: +    Significant comorbidities and modifying factors include see HPI      Patient " Active Problem List    Diagnosis Date Noted   • Insomnia 06/17/2021   • Has daytime drowsiness 06/17/2021   • At risk for osteopenia 01/14/2019   • Depression 01/14/2019   • 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   • Not immune to rubella 04/16/2014   • Smoker 02/26/2014   • History of Fetal demise at 32 weeks 02/26/2014   • Seizure disorder (HCC) 02/26/2014       Past Medical History:   Diagnosis Date   • Daytime sleepiness    • Morning headache    • Seizure (HCC)         Past Surgical History:   Procedure Laterality Date   • DILATION AND CURETTAGE  2009       Family History   Problem Relation Age of Onset   • Diabetes Mother    • Sleep Apnea Mother    • Heart Disease Maternal Grandmother         MIx2   • Cancer Paternal Grandmother         cervical cancer   • Heart Attack Paternal Grandmother        Social History     Socioeconomic History   • Marital status: Single     Spouse name: Not on file   • Number of children: Not on file   • Years of education: Not on file   • Highest education level: Not on file   Occupational History   • Not on file   Tobacco Use   • Smoking status: Current Every Day Smoker     Packs/day: 0.50     Years: 10.00     Pack years: 5.00     Types: Cigarettes   • Smokeless tobacco: Never Used   Vaping Use   • Vaping Use: Never used   Substance and Sexual Activity   • Alcohol use: No   • Drug use: No   • Sexual activity: Never     Partners: Male     Comment: None   Other Topics Concern   • Not on file   Social History Narrative   • Not on file     Social Determinants of Health     Financial Resource Strain:    • Difficulty of Paying Living Expenses:    Food Insecurity:    • Worried About Running Out of Food in the Last Year:    • Ran Out of Food in the Last Year:    Transportation Needs:    • Lack of Transportation (Medical):    • Lack of Transportation (Non-Medical):    Physical Activity:    • Days of Exercise per Week:    • Minutes of Exercise per Session:    Stress:    • Feeling of  "Stress :    Social Connections:    • Frequency of Communication with Friends and Family:    • Frequency of Social Gatherings with Friends and Family:    • Attends Sikh Services:    • Active Member of Clubs or Organizations:    • Attends Club or Organization Meetings:    • Marital Status:    Intimate Partner Violence:    • Fear of Current or Ex-Partner:    • Emotionally Abused:    • Physically Abused:    • Sexually Abused:            ALLERGIES: Patient has no known allergies.    ROS    Constitutional: Denies weight loss, endorses fatigue.  Endorses night sweats and poor concentration.  Ears/Nose/Mouth/Throat: Denies rhinitis/nasal congestion, injury, decayed teeth/toothaches.  Cardiovascular: Denies chest pain, tightness, palpitations,   Respiratory: Endorses difficulty sleeping on back due to shortness of breath ,  Sleep: per HPI  Gastrointestinal: Denies  difficulty swallowing,  heartburn.  Genitourinary: REPORTS nocturia  Musculoskeletal: Reports muscle twitches at night and leg jerks  Neurological: endorses headaches,.    PHYSICAL EXAM    Neck circ 17 inches  /80 (BP Location: Left arm, Patient Position: Sitting, BP Cuff Size: Adult)   Pulse 60   Resp 16   Ht 1.727 m (5' 8\")   Wt (!) 125 kg (276 lb)   SpO2 100%   BMI 41.97 kg/m²   Appearance: Well-nourished, well-developed,  looks stated age, no acute distress  Eyes:   EOMI  ENMT: MASKED  Neck: Supple, trachea midline  Respiratory effort:  No intercostal retractions or use of accessory muscles  Lung auscultation:  No wheezes rhonchi rubs or rales  Cardiac: No murmurs, rubs, or gallops; regular rhythm, normal rate; no edema  Musculoskeletal:  Grossly normal; gait and station normal  Neurologic:  oriented to person, time, place, and purpose; judgement intact  Psychiatric:  No depression, anxiety, agitation        Medical Decision Making:  The medical record was reviewed in its as pertains to this referral. This includes records from primary care, " consultants notes,  referral request, hospital records, labs, imaging.    Any available diagnostic and titration nocturnal polysomnograms, home sleep apnea tests, continuous nocturnal oximetry results, multiple sleep latency tests, and compliance reports were reviewed with the patient.    Assessment:    1. Excessive daytime sleepiness  - zolpidem (AMBIEN) 5 MG Tab; Take 1 tablet by mouth at bedtime as needed for Sleep for up to 2 doses.  Dispense: 2 tablet; Refill: 0  - Polysomnography, 4 or More; Future    2. Psychophysiological insomnia  - zolpidem (AMBIEN) 5 MG Tab; Take 1 tablet by mouth at bedtime as needed for Sleep for up to 2 doses.  Dispense: 2 tablet; Refill: 0  - REFERRAL TO PSYCHOLOGY  - Polysomnography, 4 or More; Future    3. Seizure disorder (HCC)  - Polysomnography, 4 or More; Future    4. Smoker      PLAN:   The patient has signs and symptoms consistent with obstructive sleep apnea hypopnea syndrome. Will schedule a nocturnal polysomnogram.  SEIZURE MONTAGE.  Will use Ambien.   Patient was not drink any caffeinated beverages after 10 AM in the morning and not to nap on the day of her in lab study.    .  The risks of untreated sleep apnea were discussed with the patient at length. Patients with CLAUDETTE are at increased risk of cardiovascular disease including coronary artery disease, systemic arterial hypertension, pulmonary arterial hypertension, cardiac arrhythmias, and stroke. CLAUDETTE patients have an increased risk of motor vehicle accidents, type 2 diabetes, chronic kidney disease, and non-alcoholic liver disease. The patient was advised to avoid driving a motor vehicle when drowsy.        Have advised the patient to follow up with the appropriate healthcare practitioners for all other medical problems and issues.              Return to clinic: 1 to 2 weeks after PSG

## 2021-08-11 ENCOUNTER — SLEEP STUDY (OUTPATIENT)
Dept: SLEEP MEDICINE | Facility: MEDICAL CENTER | Age: 36
End: 2021-08-11
Attending: PREVENTIVE MEDICINE
Payer: MEDICAID

## 2021-08-11 DIAGNOSIS — G47.19 EXCESSIVE DAYTIME SLEEPINESS: ICD-10-CM

## 2021-08-11 DIAGNOSIS — G40.909 SEIZURE DISORDER (HCC): ICD-10-CM

## 2021-08-11 DIAGNOSIS — F51.04 PSYCHOPHYSIOLOGICAL INSOMNIA: ICD-10-CM

## 2021-08-12 NOTE — PROCEDURES
RECORDING TECHNIQUE:   After the scalp was prepared, gold plated electrodes were applied to the scalp according to the International 10-20 System. EEG (electroencephalogram) was continuously monitored from the O1-M2, O2-M1, C3-M2, C4-M1, F3-M2, and F4-M1. EOGs (electrooculograms) were monitored by electrodes placed at the left and right outer canthi.  Chin EMG (electromyogram) was monitored by electrodes placed on the mentalis and sub-mentalis muscles.  Nasal and oral airflow were monitored using a triple port thermocouple as well as oronasal pressure transducer.  Respiratory effort was measured by inductive plethysmography technology employing abdominal and thoracic belts.  Blood oxygen saturation and pulse were monitored by pulse oximetry.  Heart rhythm was monitored by surface electrocardiogram.  Leg EMG was studied using surface electrodes placed on left and right anterior tibialis.  A microphone was used to monitor tracheal sounds and snoring.  Body position was monitored and documented by technician observation.  Additional EEG  needs were added for the seizure montage.      MONTAGE: Seizure montage    STUDY TYPE: Split-night titration    SCORING CRITERIA:    A modification of the AASM manual for scoring of sleep and associated events was used.  Obstructive apneas were scored by cessation of airflow for at least 10 seconds with continuing respiratory effort.  Central apneas  were  scored by cessation of airflow for at least 10 seconds with no respiratory effort.  Hypopneas were scored by a 30% or more reduction in airflow for at least 10 seconds accompanied by arterial oxygen desaturation of 3%  or an arousal.  CMS, for Medicare patients , hypopneas are scored by 30% with mild reduction in airflow for at least 10 seconds accompanied by arterial saturation decreased at 4%.       Study data:    DIAGNOSTIC:  Study start time was 09:01:23 PM. Total recording time was 2h 46.5m (166 minutes) with a total sleep  time of 2h 10.0m (130 minutes) resulting in a sleep efficiency of 78.08%.  Sleep latency from the start of the study was 28 minutes and REM latency from sleep onset was 109 minutes.  Respiratory:   There were 0 apneas in total consisting of 0 obstructive apneas, 0 mixed apneas, and 0 central apneas. There were 15 hypopneas in total.  The apnea index was 0.00 per hour and the hypopnea index was 6.92 per hour.  The overall AHI was 6.9, with a REM AHI of 32.20, and a supine AHI of 6.00.  Limb Movements:  There were a total of 32 periodic leg movements, of which 4 were PLMS arousals. This resulted in a PLMS index of 14.8 and a PLMS arousal index of 1.8  Oximetry:  The mean SaO2 was 94.0% for the diagnostic portion of the study, with a minimum SaO2 of 85.0%.       Treatment:    Treatment recording time was 4h 50.0m (290 minutes) with a total sleep time of 1h 53.0m (113 minutes) resulting in a sleep efficiency of 39.0%.   Sleep latency from the start of treatment was 15 minutes and REM latency from sleep onset was 2h 43.0m minutes.   The patient had 20 arousals in total for an arousal index of 10.6.  Respiratory:   There were 0 apneas in total consisting of 0 obstructive apneas, 0 central apneas, and 0 mixed apneas for an apnea index of 0.00.   The patient had 7 hypopneas in total, which resulted in a hypopnea index of 3.72.   The overall AHI was 3.72, with a REM AHI of 17.56, and a supine AHI of 3.80.   Limb Movements:  There were a total of 26 periodic leg movements, of which 3 were PLMS arousals. This resulted in a PLMS index of 13.8 and a PLMS arousal index of 1.6.  Oximetry:  The mean SaO2 during treatment was 95.0%, with a minimum oxygen saturation of 88.0%.     TITRATION: CPAP was tried from 4 cm H2O to 10 cm H2O.     This was a fully attended sleep study .   This test was technically adequate. This patient was titrated on CPAP starting at 4 cm of water pressure.  Patient was slowly titrated up to 10 cm of water  pressure.  Patient did best at well at all of these pressures.  The highest AHI during titration was 10.4 which is consistent with mild obstructive sleep apnea.  Oxygen was stabilized with titration.     ASSESSMENT:     DIAGNOSTIC :   Mild obstructive sleep apnea hypopnea - AHI overall: 6.9.  REM AHI in the diagnostic study was 32.2.  No significant nocturnal desaturation - kodi saturation 85.0%.    TREATMENT :   The obstructive sleep apnea hypopnea were essentially eliminated.  The overall with an AHI of 3.2.   No significant nocturnal desaturation - kodi saturation 88.0%.        RECOMMENDATION:     A ResMed APAP machine will be ordered    Minimum pressure of 7 with a maximum pressure of 14  cm of water and heated humidification with data available on cloud.. Access to this patient's compliance data and usage data will allow for a further empiric titration. A careful mask fit is required.  Mask per patient's choice.  No supplemental oxygen is needed.                 HYPNOGRAM:

## 2021-08-30 ENCOUNTER — APPOINTMENT (OUTPATIENT)
Dept: SLEEP MEDICINE | Facility: MEDICAL CENTER | Age: 36
End: 2021-08-30
Payer: MEDICAID

## 2021-08-30 PROCEDURE — 95811 POLYSOM 6/>YRS CPAP 4/> PARM: CPT | Performed by: PREVENTIVE MEDICINE

## 2021-09-15 NOTE — PROGRESS NOTES
Follow-up visit    Patient: Beata Gomes  : 1985    Referring Physician: No ref. provider found   Yahir Rudd D.O.    CC: seizures    IMPRESSION:    1. Seizure type and frequency of seizures- presumed JOEL with GTC  Seizure type and semiology: myoclonic jerking and GTC   Seizure frequency  Last  seizure GTC 2018 but currenlty weekly jerking   2. Etiology/Syndrome- genetic   3. EEG findings- normal awake  4. Brain imaging n/a  5. Antiepileptic drug side effects and counseling done   VPA ER   LEV   6. Surgery consideration vns done   7. Safety issues counseling done   8. Reproductive, contraception, pregnancy discussion done, advised compliance    excessive daytime drowsiness and loud snore, sleep study mild obstructive sleep apnea hypopnea     Weight gain with VPA    PLAN:  1. Myoclonic seizure disorder (HCC)  - divalproex ER (DEPAKOTE ER) 500 MG TABLET SR 24 HR; 1 tab twice a day  Dispense: 180 Tablet; Refill: 1  - levETIRAcetam (KEPPRA) 500 MG Tab; Take half tablet twice a day for 1 week then 1 tab twice a day  Dispense: 180 Tablet; Refill: 1    2. Reactive depression  - divalproex ER (DEPAKOTE ER) 500 MG TABLET SR 24 HR; 1 tab twice a day  Dispense: 180 Tablet; Refill: 1   plan to decrease VPA to 500 mg bid, consider to increase LEV to 750 mg bid if she has recurrent myoclonic jerking.   3. Surveillance labs: was recently done 2021   4. Woman within reproductive age: side effect of AEDs was discussed, no plan for future pregnancy, on Birth control   5. Return in 3 months or sooner if needed.        HISTORY:    I had the opportunity to see Ms. Beata Gomes in the epilepsy clinic on 2021 for follow up on seizure disorder. she came alone. she was last seen by myself 2021     Dominant hand: right handed     In brief, her pertinent medical history is remarkable for depression and mood disorder.     Seizure type 1 - myoclonic jerking     The onset of seizure was 18, the ictal behavior was  stereotyped and described as  Random jerking, in cluster, early morning, with brief LOC 1-3 seconds. Some resultant fall  Duration 1-3 seconds, frequency -4 times per week.      Seizure type 2- GTC  The onset of seizure was 16 (4 months after started to have myoclonic jerking), the ictal behavior was stereotyped and described as jerking followed by GTC.   The last seizure was morning of 2018    Seizure type 3- absence seizure  No longer active     The longest seizure free period was 3 years for GTC but weekly myoclonic jerking.   The seizures were isolated but recurred occasionally up to a few times in one day. There was    no cluster of seizures,    no history of status epilepticus    Special features:  Typically has jerks in the morning when has poor sleep.  If she doesn't nap, in the afternoon, she will jerk a lot in the evening.,     Potential triggering factors were reviewed   Sleep deprivation +   Alcohol   Stress +   Periods +   Other    Sleep is not good, she has trouble to fall asleep. She snores and still sleepy after 7 hours sleep. She sleeps alone but she arouses due to weird noise and loud snore.     Epilepsy risk factors:     -Positive: mother had seizure at age 15 and outgrew at 21. and uncle had seizure   -Negative: Normal prenatal and  course. Normal development physically and intellectually. No febrile convulsions. No history of severe head trauma. No meningitis. No stroke. No alcohol abuse. No significant exposure to recreational drugs.  Drop out from high school due to the seizures, now has a GED now. 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      Interval history:  No more jerking since she was started on LEV. Tolerates well with current dose.   She is less stressed now her daugther started school instead of online school.       Current AEDs:  VPA  mg, 2/3  /500    Previous workup:    1. EEG data: [facility] EEG awake only 2019 [facility]    2.  Neuroimaging data: .No valid procedures specified.    3. Recent AED level: VPA 56     Prior antiepileptic medications were reviewed  Carbamazepine (Tegretol)  and Valproate(Depakote)    Antiepileptic medications most useful:    Other therapeutic interventions:   Vagus nerve stimulation   Diet   Surgery for epilepsy   Other    I reviewed the previous medical history, surgical, family and social histories in the electronic medical record.   On review of systems, 10 of 14 systems are reviewed and found to be negative except as listed above.     Current Outpatient Medications   Medication Sig Dispense Refill   • divalproex ER (DEPAKOTE ER) 500 MG TABLET SR 24 HR 1 tab twice a day 180 Tablet 1   • levETIRAcetam (KEPPRA) 500 MG Tab Take half tablet twice a day for 1 week then 1 tab twice a day 180 Tablet 1   • Prenatal Vit-Fe Fumarate-FA (NIVA-PLUS) 27-1 MG Tab Take 1 tablet by mouth.     • ASHLYNA 0.15-0.03 &0.01 MG Tab Take 1 tablet by mouth every day.     • vitamin D (CHOLECALCIFEROL) 1000 UNIT Tab Take 1,000 Units by mouth every day.     • metoprolol (LOPRESSOR) 25 MG Tab Take 25 mg by mouth 2 Times a Day.     • zolpidem (AMBIEN) 5 MG Tab Take 1 tablet by mouth at bedtime as needed for Sleep for up to 2 doses. (Patient not taking: Reported on 9/17/2021) 2 tablet 0     No current facility-administered medications for this visit.        No Known Allergies    Past Medical History:   Diagnosis Date   • Daytime sleepiness    • Morning headache    • Seizure (HCC)        Social History     Socioeconomic History   • Marital status: Single     Spouse name: Not on file   • Number of children: Not on file   • Years of education: Not on file   • Highest education level: Not on file   Occupational History   • Not on file   Tobacco Use   • Smoking status: Current Every Day Smoker     Packs/day: 0.50     Years: 10.00     Pack years: 5.00     Types: Cigarettes   • Smokeless tobacco: Never Used   Vaping Use   • Vaping Use: Never used  "  Substance and Sexual Activity   • Alcohol use: No   • Drug use: No   • Sexual activity: Never     Partners: Male     Comment: None   Other Topics Concern   • Not on file   Social History Narrative   • Not on file     Social Determinants of Health     Financial Resource Strain:    • Difficulty of Paying Living Expenses:    Food Insecurity:    • Worried About Running Out of Food in the Last Year:    • Ran Out of Food in the Last Year:    Transportation Needs:    • Lack of Transportation (Medical):    • Lack of Transportation (Non-Medical):    Physical Activity:    • Days of Exercise per Week:    • Minutes of Exercise per Session:    Stress:    • Feeling of Stress :    Social Connections:    • Frequency of Communication with Friends and Family:    • Frequency of Social Gatherings with Friends and Family:    • Attends Worship Services:    • Active Member of Clubs or Organizations:    • Attends Club or Organization Meetings:    • Marital Status:    Intimate Partner Violence:    • Fear of Current or Ex-Partner:    • Emotionally Abused:    • Physically Abused:    • Sexually Abused:        Family History   Problem Relation Age of Onset   • Diabetes Mother    • Sleep Apnea Mother    • Heart Disease Maternal Grandmother         MIx2   • Cancer Paternal Grandmother         cervical cancer   • Heart Attack Paternal Grandmother          PHYSICAL EXAM:      /78 (BP Location: Right arm, Patient Position: Sitting, BP Cuff Size: Adult)   Pulse 83   Ht 1.702 m (5' 7\")   Wt 125 kg (274 lb 11.1 oz)   SpO2 95%   BMI 43.02 kg/m²     On examination,  She appears her stated age. She has a normal, reactive affect.No apparent distress,  alert and appropriate. No labored breathing.   There is no peripheral edema. Skin: no rash or abnormalities     She gives a precise account of  her clinical symptoms. She has fluent conversational speech, without error. No dysarthria. facial movements intact. No UE drift. I see no " tremor.    Gait is normal    The total visit duration was of 21 minutes of which more than 50% was spent in coordination of care and counseling.         Saúl Horner MD  Diplomate, American Board of Psychiatry and Neurology   Diplomate, American Board of Psychiatry and Neurology in Epilepsy

## 2021-09-17 ENCOUNTER — OFFICE VISIT (OUTPATIENT)
Dept: NEUROLOGY | Facility: MEDICAL CENTER | Age: 36
End: 2021-09-17
Attending: PSYCHIATRY & NEUROLOGY
Payer: MEDICAID

## 2021-09-17 VITALS
DIASTOLIC BLOOD PRESSURE: 78 MMHG | BODY MASS INDEX: 43.11 KG/M2 | HEIGHT: 67 IN | SYSTOLIC BLOOD PRESSURE: 124 MMHG | HEART RATE: 83 BPM | WEIGHT: 274.69 LBS | OXYGEN SATURATION: 95 %

## 2021-09-17 DIAGNOSIS — G40.409 MYOCLONIC SEIZURE DISORDER (HCC): ICD-10-CM

## 2021-09-17 DIAGNOSIS — F32.9 REACTIVE DEPRESSION: ICD-10-CM

## 2021-09-17 PROCEDURE — 99213 OFFICE O/P EST LOW 20 MIN: CPT | Performed by: PSYCHIATRY & NEUROLOGY

## 2021-09-17 PROCEDURE — 99211 OFF/OP EST MAY X REQ PHY/QHP: CPT | Performed by: PSYCHIATRY & NEUROLOGY

## 2021-09-17 RX ORDER — LEVETIRACETAM 500 MG/1
TABLET ORAL
Qty: 180 TABLET | Refills: 1 | Status: SHIPPED | OUTPATIENT
Start: 2021-09-17 | End: 2022-05-09

## 2021-09-17 RX ORDER — DIVALPROEX SODIUM 500 MG/1
TABLET, EXTENDED RELEASE ORAL
Qty: 180 TABLET | Refills: 1 | Status: SHIPPED | OUTPATIENT
Start: 2021-09-17 | End: 2022-03-17

## 2021-09-17 ASSESSMENT — PATIENT HEALTH QUESTIONNAIRE - PHQ9: CLINICAL INTERPRETATION OF PHQ2 SCORE: 0

## 2021-09-17 NOTE — PATIENT INSTRUCTIONS
Seizure precautions    Driving    Every State restricts driving in people with seizures. [place] requires that you be seizure free for 3 months from the date of your last seizure. The Department of Motor Vehicles (DMV), not the doctor, makes the decision on driving. Exceptions may be made for seizures that do not affect mental condition and ability to control a car, or that occur 100% during sleep.. We recommend:  § Do not drive if you are having seizures that would be dangerous on the road.  § Be honest with your doctor about your seizures, even if driving may be at risk  § Be honest with the DMV (use the driving form). It may protect you legally if problems later occur.    Seizure medicines and suicide    Seizure medicines have long been known to help some people with depression, but also to make others worse. The FDA recently took a look at their database of clinical studies of people taking epilepsy medicines. Their finding was 4 suicides in 27,863 patients taking epilepsy medicines, versus none in patients taking placebo (an inactive pill). They reported 105 people of the 27,863 who did not commit suicide, but had thoughts of suicide. Combined, the risk for suicidal thoughts and behavior was about 0.4% (1 in 250) for those taking epilepsy medications and 0.2% (1 in 500) for those given placebos.    This is new and important information because doctors and patients need to know the possible side effects of medicines. But it needs to be put in perspective and certainly is no reason for panic. The 4 suicides in 27,863 is a very small percentage, and it is impossible to be sure the epilepsy drugs were the cause. Depression occurs in about 10% or more of people with epilepsy, even independent of medications. We recommend the following.  § Do not stop your seizure medicine. It could be dangerous.  § If you have symptoms of depression, such as crying and low mood, please discuss them at your clinic visits, so a  decision can be made about whether antidepressant medication or referral to a psychotherapist would be useful.  § If you are thinking seriously about suicide, please call 911.  § For most people, this FDA warning is just something to know, but not a reason to change medicines.    Bone health    Several of the older antiseizure medications may cause thinning of bones with long-term use, leading to broken bones later in life. This is most problematic with phenytoin (Dilantin), but may occur with carbamazepine (Tegretol, Carbatrol), phenobarbital, primidone (Mysoline) and possibly valproate/valproic acid (Depakote, Depakene). This is a larger concern for women in mid-life or older, but it also can be an issue for men and younger women.   § This potential problem is not an emergency and occurs over months to years; do not stop your seizure medication without discussing your concerns with your doctor.   § Calcium, vitamin D3 supplementation and regular exercise may be helpful to maintain bone health.  § Periodic bone density screening may be helpful to show existence or progression of bone thinning.     Water Safety    You could drown during a seizure that occurs in water. Use the [employer] system for swimming. Let the keaton know that you have seizures. Take showers instead of baths.    Burn safety    If you have uncontrolled seizures, be very careful around heat or flames. Cook on the back burner - you are less likely to lean on the burner or turn over the soup during a seizure. Don’t smoke, which is good advice for other reasons as well. Set the maximum house hot water temperature to 110 degrees Fahrenheit. Put guards on open fireplaces, wood stoves or radiators.     Heights    Occasional use of ladders and going up and down stairs is a reasonable risk. If your seizures are not in control, then do not work on ladders or unprotected heights for more than brief minutes.    Equipment and Power Tools    Cutting, chopping and  drilling equipment should have safety guards to avoid inadvertent injury; otherwise, do not use it if your seizures are not fully controlled. Do not use mowers lacking automatic stop switches or chain saws.     Safety    If you have uncontrolled seizures, do not carry your child in your arms, but use one of the slings/papooses. Change the baby on the floor. Do not bathe the baby in water deep enough for the mouth to be underwater. Breastfeeding is usually considered beneficial, even though small amounts of seizure medicines come out in the breast milk.    Fall Precautions      If you fall with some of your seizures, then fall-proof your environment. Put in carpets, cover sharp corners, and consider wearing a protective helmet in some circumstances.    Sudden Unexplained Death in Epilepsy (SUDEP)    It is rare for people to die from a seizure, but it can happen. One way is trauma or a car crash from a seizure. Another is the poorly understood condition called sudden unexplained death in epilepsy (SUDEP). We think this is most likely due to heart arrhythmias (irregular beats) caused by a seizure, but the mechanism is debated. For people with uncontrolled seizures we recommend:  § Do not suddenly stop your seizure medication, since this can be a risk factor for SUDEP.  § Do not be overly worried about SUDEP. It is tragic when it happens, but it is uncommon and there are currently no preventive measures, other than the best possible seizure control.    Medication Side Effects    To be approved for prescription use, seizure medicines must pass strict safety testing. Nevertheless, they all have side effects, some of which are potentially serious or even lethal. The risks of medications must be balanced against the risks of seizures. A full discussion of possible medicine side effects is not possible here, but we recommend:  § Know the main side effects of your seizure medicines. Your doctor is the best source  for individual information. Web sites such as epilepsyfoundation.org and epilepsy.com have good information.  § The package insert provided with your prescription lists full information on side effects, but most of these will never occur in an individual. Let the package insert inform you, but not scare you. Be aware that some side effects occur from drug interactions among all your medications. Interactions can involve prescription medicines, over-the-counter medicines, herbal remedies and even some foods. Grapefruit juice is an example of a seemingly benign food that can raise levels of carbamazepine or other drugs.  § Generic medications are less expensive, but may not produce the same blood levels as do brand name drugs or even other generics. Insurance plans and pharmacies sometimes switch to generics without patient or doctor approval. Be cautious if you are switching or being switched to generics. It may work out fine (and it often is a lot less expensive), but a blood test to check levels might be useful.    Recreation    If having a seizure during a recreational activity would cause you significant harm, then do not do the activity. Use common sense. Confer with your medical team for individual restrictions. As a general guideline for starting discussion with your medical team, we recommend:  § Low-risk recreation can be done by people with seizures, even if not in control. These include walking, running, bowling, golf, baseball, basketball, soccer, volleyball, swimming with the [employer] system, weight training with machines or spotters, elliptical trainers, treadmills with spotters. Confirm this with your medical care team.  § You should be able to go at least 3 months without a seizure to participate in medium-risk activities, but confirm this interval with your doctor. These include football, hockey, ice skating, bike racing, gymnastics, horseback riding and boating.  § You should be seizure free for more  than a year to perform high-risk activities, although some doctors recommend not engaging in high-risk recreational activities at all with a history of epilepsy. Ask yours if it is safe to engage in high-risk recreation. High-risk activities include hang gliding, motor sports, skiing, competitive skateboarding, mountain or rock climbing and SCUBA diving.

## 2021-09-24 ENCOUNTER — APPOINTMENT (OUTPATIENT)
Dept: SLEEP MEDICINE | Facility: MEDICAL CENTER | Age: 36
End: 2021-09-24
Payer: MEDICAID

## 2022-03-08 NOTE — PROGRESS NOTES
Follow-up visit    Patient: Beata Gomes  : 1985    Referring Physician: No ref. provider found   Yahir Rudd D.O.    CC: seizures    IMPRESSION:    1. Seizure type and frequency of seizures- presumed JOEL with GTC  Seizure type and semiology: myoclonic jerking and GTC   Seizure frequency  Last  seizure GTC 2018 but currenlty weekly jerking   2. Etiology/Syndrome- genetic   3. EEG findings- normal awake  4. Brain imaging n/a  5. Antiepileptic drug side effects and counseling done   VPA ER   LEV   6. Surgery consideration vns done   7. Safety issues counseling done   8. Reproductive, contraception, pregnancy discussion done, advised compliance    excessive daytime drowsiness and loud snore, sleep study mild obstructive sleep apnea hypopnea     Weight gain with VPA    PLAN:  There are no diagnoses linked to this encounter. plan to decrease VPA to 500 mg bid, consider to increase LEV to 750 mg bid if she has recurrent myoclonic jerking.   3. Surveillance labs: was recently done 2021   4. Woman within reproductive age: side effect of AEDs was discussed, no plan for future pregnancy, on Birth control   5. Return in 3 months or sooner if needed.        HISTORY:    I had the opportunity to see Ms. Beata Gomes in the epilepsy clinic on 3/14/2022 for follow up on seizure disorder. she came alone. she was last seen by myself on 2021.     Dominant hand: right handed     In brief, her pertinent medical history is remarkable for depression and mood disorder.     Seizure type 1 - myoclonic jerking     The onset of seizure was 18, the ictal behavior was stereotyped and described as  Random jerking, in cluster, early morning, with brief LOC 1-3 seconds. Some resultant fall  Duration 1-3 seconds, frequency -4 times per week.      Seizure type 2- GTC  The onset of seizure was 16 (4 months after started to have myoclonic jerking), the ictal behavior was stereotyped and described as jerking followed by GTC.    The last seizure was morning     Seizure type 3- absence seizure  No longer active     The longest seizure free period was 3 years for GTC but weekly myoclonic jerking.   The seizures were isolated but recurred occasionally up to a few times in one day. There was    no cluster of seizures,    no history of status epilepticus    Special features:  Typically has jerks in the morning when has poor sleep.  If she doesn't nap, in the afternoon, she will jerk a lot in the evening.,     Potential triggering factors were reviewed   Sleep deprivation +   Alcohol   Stress +   Periods +   Other    Sleep is not good, she has trouble to fall asleep. She snores and still sleepy after 7 hours sleep. She sleeps alone but she arouses due to weird noise and loud snore.     Epilepsy risk factors:     -Positive: mother had seizure at age 15 and outgrew at 21. and uncle had seizure   -Negative: Normal prenatal and  course. Normal development physically and intellectually. No febrile convulsions. No history of severe head trauma. No meningitis. No stroke. No alcohol abuse. No significant exposure to recreational drugs.  Drop out from high school due to the seizures, now has a GED now. Working on [employer] making [employer], etc.  She is really enjoying her job.    2021 No more jerking since she was started on LEV. Tolerates well with current dose.   She is less stressed now her daugther started school instead of online school.     Interval history:        Current AEDs:  VPA  mg, 2/3  /500    Previous workup:    1. EEG data: [facility] EEG awake only 2019 [facility]    2. Neuroimaging data: .No valid procedures specified.    3. Recent AED level: VPA 56     Prior antiepileptic medications were reviewed  Carbamazepine (Tegretol)  and Valproate(Depakote)    Antiepileptic medications most useful:    Other therapeutic interventions:   Vagus nerve stimulation   Diet   Surgery for epilepsy   Other    I reviewed the previous  medical history, surgical, family and social histories in the electronic medical record.   On review of systems, 10 of 14 systems are reviewed and found to be negative except as listed above.     Current Outpatient Medications   Medication Sig Dispense Refill   • divalproex ER (DEPAKOTE ER) 500 MG TABLET SR 24 HR 1 tab twice a day 180 Tablet 1   • levETIRAcetam (KEPPRA) 500 MG Tab Take half tablet twice a day for 1 week then 1 tab twice a day 180 Tablet 1   • zolpidem (AMBIEN) 5 MG Tab Take 1 tablet by mouth at bedtime as needed for Sleep for up to 2 doses. (Patient not taking: Reported on 9/17/2021) 2 tablet 0   • Prenatal Vit-Fe Fumarate-FA (NIVA-PLUS) 27-1 MG Tab Take 1 tablet by mouth.     • ASHLYNA 0.15-0.03 &0.01 MG Tab Take 1 tablet by mouth every day.     • vitamin D (CHOLECALCIFEROL) 1000 UNIT Tab Take 1,000 Units by mouth every day.     • metoprolol (LOPRESSOR) 25 MG Tab Take 25 mg by mouth 2 Times a Day.       No current facility-administered medications for this visit.        No Known Allergies    Past Medical History:   Diagnosis Date   • Daytime sleepiness    • Morning headache    • Seizure (HCC)        Social History     Socioeconomic History   • Marital status: Single     Spouse name: Not on file   • Number of children: Not on file   • Years of education: Not on file   • Highest education level: Not on file   Occupational History   • Not on file   Tobacco Use   • Smoking status: Current Every Day Smoker     Packs/day: 0.50     Years: 10.00     Pack years: 5.00     Types: Cigarettes   • Smokeless tobacco: Never Used   Vaping Use   • Vaping Use: Never used   Substance and Sexual Activity   • Alcohol use: No   • Drug use: No   • Sexual activity: Never     Partners: Male     Comment: None   Other Topics Concern   • Not on file   Social History Narrative   • Not on file     Social Determinants of Health     Financial Resource Strain: Not on file   Food Insecurity: Not on file   Transportation Needs: Not  on file   Physical Activity: Not on file   Stress: Not on file   Social Connections: Not on file   Intimate Partner Violence: Not on file   Housing Stability: Not on file       Family History   Problem Relation Age of Onset   • Diabetes Mother    • Sleep Apnea Mother    • Heart Disease Maternal Grandmother         MIx2   • Cancer Paternal Grandmother         cervical cancer   • Heart Attack Paternal Grandmother          PHYSICAL EXAM:      There were no vitals taken for this visit.    On examination,  She appears her stated age. She has a normal, reactive affect.No apparent distress,  alert and appropriate. No labored breathing.   There is no peripheral edema. Skin: no rash or abnormalities     She gives a precise account of  her clinical symptoms. She has fluent conversational speech, without error. No dysarthria. facial movements intact. No UE drift. I see no tremor.    Gait is normal    The total visit duration was of 21 minutes of which more than 50% was spent in coordination of care and counseling.         Saúl Horner MD  Diplomate, American Board of Psychiatry and Neurology   Diplomate, American Board of Psychiatry and Neurology in Epilepsy

## 2022-03-14 ENCOUNTER — APPOINTMENT (OUTPATIENT)
Dept: NEUROLOGY | Facility: MEDICAL CENTER | Age: 37
End: 2022-03-14
Attending: PSYCHIATRY & NEUROLOGY
Payer: MEDICAID

## 2022-04-14 NOTE — PROGRESS NOTES
Chief Complaint   Patient presents with   • New Patient     Seizures       Problem List Items Addressed This Visit     Depression    Relevant Orders    Patient has been identified as having a positive depression screening. Appropriate orders and counseling have been given. (Completed)      Other Visit Diagnoses     Intractable generalized idiopathic epilepsy without status epilepticus (HCC)        Relevant Medications    brivaracetam (BRIVIACT) 100 MG Tab tablet    divalproex ER (DEPAKOTE ER) 500 MG TABLET SR 24 HR    Other Relevant Orders    Referral to Neurodiagnostics (EEG,EP,EMG/NCS/DBS)    CBC WITH DIFFERENTIAL    Comp Metabolic Panel    VALPROIC ACID    AMMONIA    Vitamin D deficiency        Relevant Orders    VITAMIN D,25 HYDROXY          History of present illness:  Beata Gomes 36 y.o. female presents today to establish care with me. Last seen by Dr. Horner in Sept 2021    Pt reports she has 3 types of seizures: GTC, body jerking for a couple seconds, and staring that started at age 17. She had tongue biting and bladder incontinence. Seizures were happening everyday. She had tried neurontin, tegretol, and other ASMs but she cannot remember. Last GTC was in 2018. Last jerking was 1 week ago. She is on depakote ER 500mg BID and Keppra 500mg BID. She states Dr. Horner Rx'd keppra during her last visit as her jerking was happening daily and she has seen significant improvement to where she's having this now once every 2 months. Staring off are happening once every 2 months, as well.  Last staring off was 3-4 days ago. Duration:5-10min. She denies clustering seizures. Triggers: fatigue, monthly period. Never had an aura. She notices feeling tired and getting mad intermittently for no reason on the keppra. She wants to switch this medication.     Mood in general is good. Has underlying depression. No SI or HI. She was referred to one and she didn't like it. She is hesitant to try it again.      Mom, grandma and  uncle also had epilepsy. No TBI    No alcohol use. She is smoking cigarettes 1/2 ppd. No recreational drug use.     She is taking vit D.     She is not driving     She is not sexually active. She is using OCP. Taking prenal vit. No plans of getting pregnant.          Past medical history:   Past Medical History:   Diagnosis Date   • Daytime sleepiness    • Morning headache    • Seizure (HCC)        Past surgical history:   Past Surgical History:   Procedure Laterality Date   • DILATION AND CURETTAGE  2009       Family history:   Family History   Problem Relation Age of Onset   • Diabetes Mother    • Sleep Apnea Mother    • Heart Disease Maternal Grandmother         MIx2   • Cancer Paternal Grandmother         cervical cancer   • Heart Attack Paternal Grandmother        Social history:   Social History     Socioeconomic History   • Marital status: Single     Spouse name: Not on file   • Number of children: Not on file   • Years of education: Not on file   • Highest education level: Not on file   Occupational History   • Not on file   Tobacco Use   • Smoking status: Current Every Day Smoker     Packs/day: 0.50     Years: 10.00     Pack years: 5.00     Types: Cigarettes   • Smokeless tobacco: Never Used   Vaping Use   • Vaping Use: Never used   Substance and Sexual Activity   • Alcohol use: No   • Drug use: No   • Sexual activity: Never     Partners: Male     Comment: None   Other Topics Concern   • Not on file   Social History Narrative   • Not on file     Social Determinants of Health     Financial Resource Strain: Not on file   Food Insecurity: Not on file   Transportation Needs: Not on file   Physical Activity: Not on file   Stress: Not on file   Social Connections: Not on file   Intimate Partner Violence: Not on file   Housing Stability: Not on file       Current medications:   Current Outpatient Medications   Medication   • divalproex ER (DEPAKOTE ER) 500 MG TABLET SR 24 HR   • levETIRAcetam (KEPPRA) 500 MG Tab  "  • zolpidem (AMBIEN) 5 MG Tab   • Prenatal Vit-Fe Fumarate-FA (NIVA-PLUS) 27-1 MG Tab   • ASHLYNA 0.15-0.03 &0.01 MG Tab   • vitamin D (CHOLECALCIFEROL) 1000 UNIT Tab   • metoprolol (LOPRESSOR) 25 MG Tab     No current facility-administered medications for this visit.       Medication Allergy:  No Known Allergies      Review of systems:     General: Denies fevers or chills, or nightsweats, or generalized fatigue.    Head: Denies headaches or dizziness or lightheadedness  EENT: Denies vision changes, vision loss or pain, nasal secretion, nasal bleeding, difficulty swallowing, hearing loss, tinnitus, vertigo, ear pain  Respiratory: Denies shortness of breath, cough, sputum, or wheezing  Cardiac: Denies chest pain, palpitations, edema or syncope  Gastrointestinal: Denies nausea, vomiting, no abdominal pain or change in bowel habits, no melena or hematochezia  Urinary: Denies dysuria, frequency, hesitancy, or incontinence.  Dermatologic:  Denies new rash  Musculoskeletal: Denies muscle pain or swelling, no atrophy, no neck and back pain or stiffness.   Neurologic: Denies facial droopiness, muscle weakness (focal or generalized), paresthesias, ataxia, change in speech or language, memory loss. +seizures, loss of consciousness, or episodes of confusion, mypclonus  Psychiatric: Denies anxiety,  suicidal or homicidal thoughts. +depression, mood swings       Physical examination:   Vitals:    05/09/22 1055   BP: 156/78   BP Location: Left arm   Patient Position: Sitting   BP Cuff Size: Adult   Pulse: 76   Temp: 36.9 °C (98.4 °F)   TempSrc: Temporal   SpO2: 100%   Weight: 117 kg (258 lb 9.6 oz)   Height: 1.702 m (5' 7\")     General: Patient in no acute distress, pleasant and cooperative.  HEENT: Normocephalic, no signs of acute trauma.   moist conjunctivae. Nares are patent. Oropharynx clear without lesions and normal  hard and soft palates.   Neck: Supple. There is normal range of motion.   Resp: clear to auscultation " bilaterally. No wheezes or crackles.   CV: RRR, no murmurs.   Skin: no signs of acute rashes or trauma.   Musculoskeletal: joints exhibit full range of motion  Psychiatric: No hallucinatory behavior. No symptoms of depression or suicidal ideation. Mood and affect appear normal on exam.     NEUROLOGICAL EXAM:   Mental status, orientation: Awake, alert and fully oriented.   Speech and language: speech is clear and fluent. The patient is able to name, repeat and comprehend.   Memory: There is intact recollection of recent and remote events.   Motor exam: Strength is 5/5 in all extremities. Tone is normal. No abnormal movements were seen on exam.   Sensory exam reveals normal sense of light touch in all extremities.   Gait: The patient was able to get up from seated position on first attempt without requiring assistance. Found to be steady when walking. Movements were fluid with normal arm swing.       ANCILLARY DATA REVIEWED:       Lab Data Review:  Reviewed in chart. CBC, Vit D    Records reviewed:   Reviewed in chart.    Imaging:   n/a    EEG:  EEG 2019  This is a normal routine EEG recording in the awake state.  Clinical correlation is recommended.        ASSESSMENT AND PLAN:    1. Intractable generalized idiopathic epilepsy without status epilepticus (HCC)  - Referral to Neurodiagnostics (EEG,EP,EMG/NCS/DBS)  - CBC WITH DIFFERENTIAL; Future  - Comp Metabolic Panel; Future  - VALPROIC ACID; Future  - AMMONIA; Future  - brivaracetam (BRIVIACT) 100 MG Tab tablet; Take 1 Tablet by mouth 2 times a day for 180 days.  Dispense: 60 Tablet; Refill: 5  - divalproex ER (DEPAKOTE ER) 500 MG TABLET SR 24 HR; Take 1 Tablet by mouth 2 times a day.  Dispense: 180 Tablet; Refill: 1    2. Vitamin D deficiency  - VITAMIN D,25 HYDROXY; Future    3. Depression, unspecified depression type  - Patient has been identified as having a positive depression screening. Appropriate orders and counseling have been given.          CLINICAL  DISCUSSION:  Seizures are presumed to be PGE, possibly JOEL as she has 3 seizure types and it started when she was 16yo. Last GTC was in 2018. She continues to have myoclonic jerks and staring off every 2 months. Last staring was 3-4 days ago and last myoclonic jerk was ~ a week ago. She is having side effects from the keppra that she is requesting to be switched despite the significant improvement in seizure frequency.     Has family hx of epilepsy (mom, uncle, grandmother). No TBI hx. Healthy infancy and childhood.     No alcohol use. She is smoking cigarettes 1/2 ppd. No recreational drug use.     No plans of pregnancy. She is on OCP and not sexually active.She is aware of the risk of pregnancy complications while taking ASMs which include congenital defects, abnormal fetal growth, , etc. She is taking prenatal vit. Aware that OCP maybe not as effecttive in conjunction with her ASMs and may need to switch ASMs if she plans of getting pregnant in the future.     Mood is stable. No suicidal or homicidal thoughts. Pt refused ref to psychology.     Past ASM's: keppra (mood changes)    Current ASM's: Depakote ER 500mg BID; Briviact 100mg BID      Plan:  - routine EEG. May need longer EEG.     -She hasn't had any blood work done since . Has thrombocytopenia in 2020. No CMP to review. Given this, I will not increase her depakote dose. We agreed to switch her keppra to briviact. Aware of side effects including dizziness, drowsiness, fatigue. She will stop keppra tonight and start briviact tonight. I will try higher dose to see if that will control her seizures. Samples given today.     - Discussed avoidance of spell/sz triggers: alcohol, sleep deprivation, and stress.    - Discussed Vit D supplementation. Recommended taking 2000-5000u daily.    - Discussed driving restrictions. She is not driving.     -Labs to be checked for next appointment: CBC, CMP, vit D, ammonia, VPA          FOLLOW-UP:   Return in about  3 months (around 2022).      EDUCATION AND COUNSELING:  -Education was provided to the patient and/or family regarding diagnosis and prognosis. The chronic and unpredictable nature of the condition were discussed. There is increased risk for additional events, which may carry potential for significant injuries and death. Discussed frequent seizure triggers: sleep deprivation, medication non-compliance, use of illegal drugs/alcohol, stress, and others.   -We reviewed in detail the current antiepileptic regimen. Potential side effects of antiepileptics were discussed at length, including but no limited to: hypersensitivity reactions (rash and others, some of which can be fatal), visual field changes (some of which may be irreversible), glaucoma, diplopia, kidney stones, osteopenia/osteoporosis/bone fractures, hyperthermia/anhydrosis, hyponatremia, tremors/abnormal movements, ataxia, dizziness, fatigue, increased risk for falls, risk for cardiac arrhythmias/syncope, gastrointestinal side effects(hepatitis, pancreatitis, gastritis, ulcers), gingival hypertrophy/bleeding, drowsiness, sedation, anxiety/nervousness, increased risk for suicide, increased risk for depression, and psychosis.   -We also reviewed drug-drug interactions and their potential effect on seizure control and medication side effects.    -We also discussed in detail potential effects of seizures, epilepsy, and medications during pregnancy, including but not limited to fetal malformations, child developmental/intellectual disability, fetal/ risk for hemorrhages, stillbirth, maternal death, premature birth, and others. The patient/family aware that pregnancy should be avoided, unless desired, in which case we recommend discussing with us at least a year prior to planned conception. To avoid undesired pregnancy while on antiepileptics, we recommend dual contraception.   -Folic acid 2 mg is recommended for all females in childbearing age (12-44  years of age).   -Recommend chronic vitamin D supplementation and regular exercise (if not contraindicated).   -Patient/family educated on risk for SUDEP (Sudden Death in Epilepsy). Counseling was provided on the importance of strict medication and follow up compliance. The patient/family understand the risks associated with non-adherence with the medical plan as outlined, including but not limited to an increased risk for breakthrough seizures, which may contribute to injuries, disability, status epilepticus, and even death.   -Counseling was also provided on potential effects of alcohol and other drugs, which may lower seizure threshold and/or affect the metabolism of antiepileptic drugs. We recommend avoidance of alcohol and illegal drugs.  -Avoid sleep deprivation.   -We extensively discussed the aspects related to safety in drivers who suffer from epilepsy. The patient is encourage to report to the Division of Motor Vehicles of any condition and/or spells related to confusion, disorientation, and/or loss of awareness and/or loss of consciousness; as these may pose a safety issue if they occur while operating a motor vehicle. The patient and/or family are ultimately responsible for exercising caution and abiding to regulations in place.   -Other seizure precautions were discussed at length, including no diving, no skydiving, no climbing or exposure to unprotected heights, no unsupervised swimming, no Jacuzzi or bathing in bathtubs or deep bodies of water. The patient/family have been advised about risks for operating any machinery while suffering from seizures / syncope / epilepsy and/or while taking antiepileptic drugs.   -The patient understands and agrees that due to the complexity of his/her diagnosis, results of any testing and further recommendations will typically be discussed/made during a face to face encounter in my office. The patient and/or family further understands it is their responsibility to  keep proper follow up.     Patient agrees with plan, as outlined.         Arlette Oconnor, MSN, APRN, FNP-C  [facility]  Office: [phone]  Fax: [phone]    BILLING DOCUMENTATION:   Total time spent including chart review before and after visit was 58min. Over 50% of the time of the visit today was spent on counseling and or coordination of care wtih the patient and/or family, with greater than 50% of the total discussing my assessment and plan as stated above.

## 2022-05-09 ENCOUNTER — OFFICE VISIT (OUTPATIENT)
Dept: NEUROLOGY | Facility: MEDICAL CENTER | Age: 37
End: 2022-05-09
Attending: NURSE PRACTITIONER
Payer: MEDICAID

## 2022-05-09 ENCOUNTER — TELEPHONE (OUTPATIENT)
Dept: NEUROLOGY | Facility: MEDICAL CENTER | Age: 37
End: 2022-05-09

## 2022-05-09 VITALS
DIASTOLIC BLOOD PRESSURE: 78 MMHG | OXYGEN SATURATION: 100 % | WEIGHT: 258.6 LBS | HEART RATE: 76 BPM | TEMPERATURE: 98.4 F | HEIGHT: 67 IN | BODY MASS INDEX: 40.59 KG/M2 | SYSTOLIC BLOOD PRESSURE: 156 MMHG

## 2022-05-09 DIAGNOSIS — E55.9 VITAMIN D DEFICIENCY: ICD-10-CM

## 2022-05-09 DIAGNOSIS — F32.A DEPRESSION, UNSPECIFIED DEPRESSION TYPE: ICD-10-CM

## 2022-05-09 DIAGNOSIS — G40.319 INTRACTABLE GENERALIZED IDIOPATHIC EPILEPSY WITHOUT STATUS EPILEPTICUS (HCC): ICD-10-CM

## 2022-05-09 PROCEDURE — 99212 OFFICE O/P EST SF 10 MIN: CPT | Performed by: NURSE PRACTITIONER

## 2022-05-09 PROCEDURE — 99417 PROLNG OP E/M EACH 15 MIN: CPT | Performed by: NURSE PRACTITIONER

## 2022-05-09 PROCEDURE — 99215 OFFICE O/P EST HI 40 MIN: CPT | Performed by: NURSE PRACTITIONER

## 2022-05-09 RX ORDER — DIVALPROEX SODIUM 500 MG/1
500 TABLET, EXTENDED RELEASE ORAL 2 TIMES DAILY
Qty: 180 TABLET | Refills: 1 | Status: SHIPPED | OUTPATIENT
Start: 2022-05-09 | End: 2022-09-27 | Stop reason: SDUPTHER

## 2022-05-09 RX ORDER — IBUPROFEN 800 MG/1
800 TABLET ORAL EVERY 8 HOURS PRN
COMMUNITY
Start: 2022-03-04 | End: 2022-09-27

## 2022-05-09 RX ORDER — .BETA.-CAROTENE, ASCORBIC ACID, CHOLECALCIFEROL, .ALPHA.-TOCOPHEROL ACETATE, DL-, THIAMINE, RIBOFLAVIN, NIACINAMIDE, PYRIDOXINE HYDROCHLORIDE, FOLIC ACID, CYANOCOBALAMIN, CALCIUM PANTOTHENATE, CALCIUM CARBONATE, FERROUS FUMARATE, ZINC OXIDE AND DOCUSATE SODIUM 1000; 100; 400; 30; 3; 3; 15; 20; 1; 12; 7; 200; 29; 20; 25 [IU]/1; MG/1; [IU]/1; MG/1; MG/1; MG/1; MG/1; MG/1; MG/1; UG/1; MG/1; MG/1; MG/1; MG/1; MG/1
1 TABLET ORAL DAILY
COMMUNITY
Start: 2022-04-19 | End: 2022-05-09

## 2022-05-09 ASSESSMENT — PATIENT HEALTH QUESTIONNAIRE - PHQ9
5. POOR APPETITE OR OVEREATING: 2 - MORE THAN HALF THE DAYS
SUM OF ALL RESPONSES TO PHQ QUESTIONS 1-9: 12
CLINICAL INTERPRETATION OF PHQ2 SCORE: 4

## 2022-05-09 NOTE — TELEPHONE ENCOUNTER
Briviact 100mg Tablet    PA called into [facility] Medicaid [employer] [phone] rep Dee MYERS approved [id number] good 05/09/2022 until 05/09/2023 TC paid copay $0.00 #180/90 DS order written for 60/30 notifying liaison Sharon Kyle. - 05/09/2022 3:01pm

## 2022-05-11 ENCOUNTER — TELEPHONE (OUTPATIENT)
Dept: NEUROLOGY | Facility: MEDICAL CENTER | Age: 37
End: 2022-05-11
Payer: MEDICAID

## 2022-05-11 NOTE — TELEPHONE ENCOUNTER
Pt called and said her briviact was not available at her pharmacy yet. I let her know that I would double check with our authorization team, and make sure the medication was released to the pharmacy. She understood. It looks like there was an authorization started on this, could you double check that for me? Thanks.

## 2022-07-13 ENCOUNTER — NON-PROVIDER VISIT (OUTPATIENT)
Dept: NEUROLOGY | Facility: MEDICAL CENTER | Age: 37
End: 2022-07-13
Attending: STUDENT IN AN ORGANIZED HEALTH CARE EDUCATION/TRAINING PROGRAM
Payer: MEDICAID

## 2022-07-13 DIAGNOSIS — G40.319 INTRACTABLE GENERALIZED IDIOPATHIC EPILEPSY WITHOUT STATUS EPILEPTICUS (HCC): ICD-10-CM

## 2022-07-13 PROCEDURE — 95816 EEG AWAKE AND DROWSY: CPT | Performed by: STUDENT IN AN ORGANIZED HEALTH CARE EDUCATION/TRAINING PROGRAM

## 2022-07-13 PROCEDURE — 95816 EEG AWAKE AND DROWSY: CPT | Mod: 26 | Performed by: STUDENT IN AN ORGANIZED HEALTH CARE EDUCATION/TRAINING PROGRAM

## 2022-07-13 NOTE — PROCEDURES
OUTPATIENT ROUTINE VIDEO ELECTROENCEPHALOGRAM REPORT      Referring provider:   AMALIA Malik  [address] [place] [address]      DOS: 07/13/2022  (0 hours and 25 minutes of total recording time).     INDICATION:  Beata Gomes 36 y.o. female presenting with seizure(s)    CURRENT ANTIEPILEPTIC AND/OR SEDATING REGIMEN:   Current Outpatient Medications   Medication Instructions   • ASHLYNA 0.15-0.03 &0.01 MG Tab 1 Tablet, Oral, DAILY   • brivaracetam (BRIVIACT) 100 mg, Oral, 2 TIMES DAILY   • divalproex ER (DEPAKOTE ER) 500 mg, Oral, 2 TIMES DAILY   • ibuprofen (MOTRIN) 800 mg, Oral, EVERY 8 HOURS PRN, for pain   • metoprolol tartrate (LOPRESSOR) 25 mg, Oral, 2 TIMES DAILY   • Prenatal Vit-Fe Fumarate-FA (NIVA-PLUS) 27-1 MG Tab 1 Tablet, Oral   • vitamin D (CHOLECALCIFEROL) 1,000 Units, Oral, DAILY        TECHNIQUE: Routine VEEG was set up by a Neurodiagnostic technologist who performed education to the patient and staff. A minimum of 23 electrodes and 23 channel recording was setup and performed by Neurodiagnostic technologist, in accordance with the international 10-20 system. The study was reviewed in bipolar and referential montages. The recording examined the patient in the  awake state(s).     DESCRIPTION OF THE RECORD:  During wakefulness, the background was continuous and showed a 10-11 Hz posterior dominant rhythm.  There was reactivity to eye closure/opening.  An anterior-posterior gradient was noted with faster beta frequencies seen anteriorly.  During drowsiness, theta/delta frequencies were seen.    EEG Sleep: Sleep was not captured.    ACTIVATION PROCEDURES:   Hyperventilation was performed by the patient for a total of 3 minutes. The technician performing the test noted good effort. This technique did not elicited any abnormalities on EEG.  and Intermittent Photic stimulation was performed in a stepwise fashion from 1 to 30 Hz and did not elicited any abnormalities on EEG.      ICTAL AND INTERICTAL FINDINGS:   No focal or generalized epileptiform activity noted.     No regional slowing was seen during this routine study.      No clinical events or seizures were reported or recorded during the study.     EKG: Sampling of the EKG recording did not demonstrate any abnormalities      EVENTS:  None    INTERPRETATION:   Normal video EEG recording in the awake state(s):  - No persistent focal asymmetries seen.  - No epileptiform discharges or other epileptiform phenomena seen   - No seizures. Clinical correlation is recommended.      Note: A normal EEG does not rule out epilepsy.  If the clinical suspicion remains high for seizures, a prolonged recording to capture clinical or subclinical events may be helpful.      Derian Dominguez MD  Department of Neurology at [facility]  General Neurologist and Epileptologist  Director of [facility]'s Level III Comprehensive Epilepsy Program  Professor of Clinical Neurology, [facility].   Phone: [phone]  Fax: [phone]  E-mail: [email or web address]

## 2022-09-19 NOTE — PROGRESS NOTES
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 female presents today for seizure f/u.    She reports of no more seizures on the new ASM combination. She is tolerating them well. No side effects. Mood is better. No SI or HI. She is working now. Had lab work done at [facility] and she showed me the result on her phone. She is taking vit D. She is not driving. She is not sexually active. She had her EEG done. No other issues.        Past medical history:   Past Medical History:   Diagnosis Date    Daytime sleepiness     Morning headache     Seizure (HCC)        Past surgical history:   Past Surgical History:   Procedure Laterality Date    DILATION AND CURETTAGE  2009       Family history:   Family History   Problem Relation Age of Onset    Diabetes Mother     Sleep Apnea Mother     Heart Disease Maternal Grandmother         MIx2    Cancer Paternal Grandmother         cervical cancer    Heart Attack Paternal Grandmother        Social history:   Social History     Socioeconomic History    Marital status: Single     Spouse name: Not on file    Number of children: Not on file    Years of education: Not on file    Highest education level: Not on file   Occupational History    Not on file   Tobacco Use    Smoking status: Every Day     Packs/day: 0.50     Years: 10.00     Pack years: 5.00     Types: Cigarettes    Smokeless tobacco: Never   Vaping Use    Vaping Use: Never used   Substance and Sexual Activity    Alcohol use: No    Drug use: No    Sexual activity: Never     Partners: Male     Comment: None   Other Topics Concern    Not on file   Social History Narrative  "   Not on file     Social Determinants of Health     Financial Resource Strain: Not on file   Food Insecurity: Not on file   Transportation Needs: Not on file   Physical Activity: Not on file   Stress: Not on file   Social Connections: Not on file   Intimate Partner Violence: Not on file   Housing Stability: Not on file       Current medications:   Current Outpatient Medications   Medication    ibuprofen (MOTRIN) 800 MG Tab    brivaracetam (BRIVIACT) 100 MG Tab tablet    divalproex ER (DEPAKOTE ER) 500 MG TABLET SR 24 HR    Prenatal Vit-Fe Fumarate-FA (NIVA-PLUS) 27-1 MG Tab    ASHLYNA 0.15-0.03 &0.01 MG Tab    vitamin D (CHOLECALCIFEROL) 1000 UNIT Tab    metoprolol (LOPRESSOR) 25 MG Tab     No current facility-administered medications for this visit.       Medication Allergy:  No Known Allergies      Review of systems:     General: Denies fevers or chills, or nightsweats, or generalized fatigue.    Head: Denies headaches or dizziness or lightheadedness  Dermatologic:  Denies new rash  Musculoskeletal: Denies muscle pain or swelling, no atrophy, no neck and back pain or stiffness.   Neurologic: Denies facial droopiness, muscle weakness (focal or generalized), paresthesias, ataxia, change in speech or language, memory loss, abnormal movements. +hx of seizures  Psychiatric: Denies anxiety, depression, mood swings, suicidal or homicidal thoughts       Physical examination:   Vitals:    09/27/22 1252   BP: 104/60   BP Location: Right arm   Patient Position: Sitting   BP Cuff Size: Large adult   Pulse: 80   Resp: 14   Temp: 36.5 °C (97.7 °F)   TempSrc: Temporal   Weight: 116 kg (256 lb 8 oz)   Height: 1.702 m (5' 7\")     General: Patient in no acute distress, pleasant and cooperative.  HEENT: Normocephalic, no signs of acute trauma.   Neck: Supple. There is normal range of motion.   Musculoskeletal: joints exhibit full range of motion.   Psychiatric: No hallucinatory behavior. No symptoms of depression or suicidal " ideation. Mood and affect appear normal on exam.     NEUROLOGICAL EXAM:   Mental status, orientation: Awake, alert and fully oriented.   Speech and language: speech is clear and fluent. The patient is able to name, repeat and comprehend.   Memory: There is intact recollection of recent and remote events.   Motor exam: Strength is 5/5 in all extremities. Tone is normal. No abnormal movements were seen on exam.   Sensory exam reveals normal sense of light touch in all extremities.   Gait: The patient was able to get up from seated position on first attempt without requiring assistance. Found to be steady when walking. Movements were fluid with normal arm swing.       ANCILLARY DATA REVIEWED:       Lab Data Review:  Reviewed in chart. CBC, CMP, Vit D, ammonia, VPA      Records reviewed:   Reviewed in chart.    Imaging:   n/a     EEG:  EEG 2019  This is a normal routine EEG recording in the awake state.  Clinical correlation is recommended.    [facility] EEG 7/13/22   Normal video EEG recording in the awake state(s):  - No persistent focal asymmetries seen.  - No epileptiform discharges or other epileptiform phenomena seen   - No seizures. Clinical correlation is recommended.        ASSESSMENT AND PLAN:  1. 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 Screening for depression                  CLINICAL DISCUSSION:  Seizures are presumed to be PGE, possibly JOEL as she has 3 seizure types and it started when she was 18yo. Last GTC was in 2018. She continued to have myoclonic jerks and staring spells until May 2022. We switched her keppra to briviact and she had been seizure free. Her Recent EEG was normal. Aware that normal EEG does not r/o epilepsy.      Has family hx of epilepsy (mom, uncle, grandmother). No TBI hx. Healthy infancy and childhood.      No alcohol use. She is smoking  cigarettes 1/2 ppd. No recreational drug use.      No plans of pregnancy. She is on OCP and not sexually active.She is aware of the risk of pregnancy complications while taking ASMs which include congenital defects, abnormal fetal growth, , etc. She is taking prenatal vit. Aware that OCP maybe not as effecttive in conjunction with her ASMs and may need to switch ASMs if she plans of getting pregnant in the future.      Mood is stable. No suicidal or homicidal thoughts. Pt refused ref to psychology.      Past ASM's: keppra (mood changes)     Current ASM's: Depakote ER 500mg BID; Briviact 100mg BID     VPA mildly subtherapeutic (42.8); ammonia WNL     Plan:  - continue ASMs     - Discussed avoidance of spell/sz triggers: alcohol, sleep deprivation, and stress.     - Discussed Vit D supplementation. Recommended taking 2000-5000u daily.     - Discussed driving restrictions. She is not driving. She wants to wait a year of seizure freedom before she tries to get her DL.      -Labs to be checked for next appointment: none    -Aware that I will no longer be with [facility] after 2022.         FOLLOW-UP:   Return in about 6 months (around 3/27/2023), or if symptoms worsen or fail to improve.      EDUCATION AND COUNSELING:  -Education was provided to the patient and/or family regarding diagnosis and prognosis. The chronic and unpredictable nature of the condition were discussed. There is increased risk for additional events, which may carry potential for significant injuries and death. Discussed frequent seizure triggers: sleep deprivation, medication non-compliance, use of illegal drugs/alcohol, stress, and others.   - There are potential side effects of antiepileptics including but no limited to: hypersensitivity reactions (rash and others, some of which can be fatal), visual field changes (some of which may be irreversible), glaucoma, diplopia, kidney stones, osteopenia/osteoporosis/bone fractures,  hyperthermia/anhydrosis, hyponatremia, tremors/abnormal movements, ataxia, dizziness, fatigue, increased risk for falls, risk for cardiac arrhythmias/syncope, gastrointestinal side effects(hepatitis, pancreatitis, gastritis, ulcers), gingival hypertrophy/bleeding, drowsiness, sedation, anxiety/nervousness, increased risk for suicide, increased risk for depression, and psychosis.   -There are potential effects of seizures, epilepsy, and medications during pregnancy, including but not limited to fetal malformations, child developmental/intellectual disability, fetal/ risk for hemorrhages, stillbirth, maternal death, premature birth, and others. The patient/family aware that pregnancy should be avoided, unless desired, in which case we recommend discussing with us at least a year prior to planned conception. To avoid undesired pregnancy while on antiepileptics, we recommend dual contraception.   -Folic acid 2 mg is recommended for all females in childbearing age (12-44 years of age).   -Recommend chronic vitamin D supplementation and regular exercise (if not contraindicated).   -Patient/family educated on risk for SUDEP (Sudden Death in Epilepsy). Counseling was provided on the importance of strict medication and follow up compliance. The patient/family understand the risks associated with non-adherence with the medical plan as outlined, including but not limited to an increased risk for breakthrough seizures, which may contribute to injuries, disability, status epilepticus, and even death.   -There are potential effects of alcohol and other drugs, which may lower seizure threshold and/or affect the metabolism of antiepileptic drugs. We recommend avoidance of alcohol and illegal drugs.  -Avoid sleep deprivation.   -The patient is encourage to report to the Division of Motor Vehicles of any condition and/or spells related to confusion, disorientation, and/or loss of awareness and/or loss of consciousness; as  these may pose a safety issue if they occur while operating a motor vehicle. The patient and/or family are ultimately responsible for exercising caution and abiding to regulations in place.   -Other seizure precautions were discussed at length, including no diving, no skydiving, no climbing or exposure to unprotected heights, no unsupervised swimming, no Jacuzzi or bathing in bathtubs or deep bodies of water. There are risks for operating any machinery while suffering from seizures / syncope / epilepsy and/or while taking antiepileptic drugs.   -The patient understands and agrees that due to the complexity of his/her diagnosis, results of any testing and further recommendations will typically be discussed/made during a face to face encounter in my office. The patient and/or family further understands it is their responsibility to keep proper follow up.     Patient agrees with plan, as outlined.         Arlette Oconnor, MSN, APRN, FNP-C  [facility]  Office: [phone]  Fax: [phone]

## 2022-09-27 ENCOUNTER — OFFICE VISIT (OUTPATIENT)
Dept: NEUROLOGY | Facility: MEDICAL CENTER | Age: 37
End: 2022-09-27
Attending: NURSE PRACTITIONER
Payer: MEDICAID

## 2022-09-27 ENCOUNTER — TELEPHONE (OUTPATIENT)
Dept: NEUROLOGY | Facility: MEDICAL CENTER | Age: 37
End: 2022-09-27

## 2022-09-27 VITALS
WEIGHT: 256.5 LBS | SYSTOLIC BLOOD PRESSURE: 104 MMHG | HEART RATE: 80 BPM | TEMPERATURE: 97.7 F | HEIGHT: 67 IN | RESPIRATION RATE: 14 BRPM | DIASTOLIC BLOOD PRESSURE: 60 MMHG | BODY MASS INDEX: 40.26 KG/M2

## 2022-09-27 DIAGNOSIS — G40.319 INTRACTABLE GENERALIZED IDIOPATHIC EPILEPSY WITHOUT STATUS EPILEPTICUS (HCC): ICD-10-CM

## 2022-09-27 DIAGNOSIS — Z13.31 SCREENING FOR DEPRESSION: ICD-10-CM

## 2022-09-27 PROCEDURE — 99212 OFFICE O/P EST SF 10 MIN: CPT | Performed by: NURSE PRACTITIONER

## 2022-09-27 PROCEDURE — 99214 OFFICE O/P EST MOD 30 MIN: CPT | Performed by: NURSE PRACTITIONER

## 2022-09-27 RX ORDER — .BETA.-CAROTENE, ASCORBIC ACID, CHOLECALCIFEROL, .ALPHA.-TOCOPHEROL ACETATE, DL-, THIAMINE, RIBOFLAVIN, NIACINAMIDE, PYRIDOXINE HYDROCHLORIDE, FOLIC ACID, CYANOCOBALAMIN, CALCIUM PANTOTHENATE, CALCIUM CARBONATE, FERROUS FUMARATE, ZINC OXIDE AND DOCUSATE SODIUM 1000; 100; 400; 30; 3; 3; 15; 20; 1; 12; 7; 200; 29; 20; 25 [IU]/1; MG/1; [IU]/1; MG/1; MG/1; MG/1; MG/1; MG/1; MG/1; UG/1; MG/1; MG/1; MG/1; MG/1; MG/1
1 TABLET ORAL DAILY
COMMUNITY
Start: 2022-09-12

## 2022-09-27 RX ORDER — DIVALPROEX SODIUM 500 MG/1
500 TABLET, EXTENDED RELEASE ORAL 2 TIMES DAILY
Qty: 180 TABLET | Refills: 1 | Status: SHIPPED | OUTPATIENT
Start: 2022-09-27 | End: 2023-09-21 | Stop reason: SDUPTHER

## 2022-09-27 NOTE — TELEPHONE ENCOUNTER
Briviact 100mg Tablet    RTS - Next available fill date on or after 10/10/2022  + - 09/27/2022 2:50pm

## 2023-04-19 ENCOUNTER — OFFICE VISIT (OUTPATIENT)
Dept: NEUROLOGY | Facility: MEDICAL CENTER | Age: 38
End: 2023-04-19
Attending: PSYCHIATRY & NEUROLOGY
Payer: MEDICAID

## 2023-04-19 ENCOUNTER — TELEPHONE (OUTPATIENT)
Dept: NEUROLOGY | Facility: MEDICAL CENTER | Age: 38
End: 2023-04-19

## 2023-04-19 VITALS
DIASTOLIC BLOOD PRESSURE: 70 MMHG | TEMPERATURE: 96.9 F | OXYGEN SATURATION: 100 % | SYSTOLIC BLOOD PRESSURE: 124 MMHG | HEART RATE: 63 BPM | WEIGHT: 246.91 LBS | BODY MASS INDEX: 38.67 KG/M2

## 2023-04-19 DIAGNOSIS — F39 MOOD DISORDER (HCC): ICD-10-CM

## 2023-04-19 DIAGNOSIS — E55.9 VITAMIN D DEFICIENCY DISEASE: ICD-10-CM

## 2023-04-19 DIAGNOSIS — G40.909 SEIZURE DISORDER (HCC): ICD-10-CM

## 2023-04-19 PROCEDURE — 99211 OFF/OP EST MAY X REQ PHY/QHP: CPT | Performed by: PSYCHIATRY & NEUROLOGY

## 2023-04-19 PROCEDURE — 99215 OFFICE O/P EST HI 40 MIN: CPT | Performed by: PSYCHIATRY & NEUROLOGY

## 2023-04-19 ASSESSMENT — PATIENT HEALTH QUESTIONNAIRE - PHQ9: CLINICAL INTERPRETATION OF PHQ2 SCORE: 0

## 2023-04-19 NOTE — TELEPHONE ENCOUNTER
Received request via: Pharmacy    Was the patient seen in the last year in this department? Yes    Does the patient have an active prescription (recently filled or refills available) for medication(s) requested? Yes.     Does the patient have jail Plus and need 100 day supply (blood pressure, diabetes and cholesterol meds only)? No

## 2023-04-19 NOTE — TELEPHONE ENCOUNTER
Briviact 50mg Tablet    Request rec'd via ANDRE, LUCIA Rey expires 05/09/2023 TC paid copay $0.00 #180/90 BRADLEY, notifying liaison Sharon Kyle or Outreach.  - 04/19/2023 11:26am

## 2023-04-19 NOTE — PROGRESS NOTES
NEUROLOGY OUTPATIENT CLINIC VISIT NOTE        Chief Complaint: History of seizures      HISTORY OF PRESENTING COMPLAINT:  Ms. Gomes is a 37 year old right handed lady coming to neurology clinic for follow-up of her history of seizures. She was previously followed in Neurology clinic by RADHA Oconnor and last seen in 09/2022. This was her first visit with me. The details of complaints were obtained from review of the previous clinic notes, available medical records and from discussing with the patient.    In summary, her complaints started at age 18, with random body jerking, in cluster, early morning, with brief loss of consciousness for few seconds and could lead to falls.  Few months later she had a whole body convulsion again preceded by twitching movements.  She was then started on Depakote.  As she continued to have intermittent muscle jerking, levetiracetam was added.  After levetiracetam was added she has not had any other twitching episodes.  She was switched to Briviact due to some of the mood issues and levetiracetam.  Patient continues to notice mood fluctuations but denied any episodes concerning for seizures.  No muscle jerking or loss of consciousness.  She described her mood issues and irritability and denied any depression or anxiety or any suicidal homicidal ideation.  Patient also mentioned having insurance issues with Briviact.  Not taken her medications for the last 3 days as she ran out of medicine.  She continues to be on Depakote 500 mg twice daily.  She has not had lab work completed since May 2022. She has not had any episodes of tonic-clonic seizures since 2018 and no episodes of muscle twitching since May 2022.    She continues to be on OCP.  She is aware of pregnancy and side effects while on Depakote.  She is on prenatal vitamins but not on vitamin D anymore.  She continues to smoke half pack per day and denies any alcohol or other  recreational drug abuse.      Patient in the past has refused referral to psychology and psychiatry but still struggles with some mood issues which she feels could be secondary to Briviact.      She denied any changes to her speech or swallowing, episodes of changes in awareness or loss of consciousness, focal weakness in upper or lower extremities, loss of bowel or bladder control or falls.  She mentioned recent which is unusual for her, as she has been on the birth control pill.    Seizure types and semiology:   Type I:  Seizure type: Muscle jerking  Warning/Aura: None   Description:  Random muscle jerking  Loss of awareness: No, earlier with 1-2 seconds of LOC  Convulsive: No  Post-ictal symptoms: No  Frequency:  None since May 2022, after Briviact added   Duration: twitching lasting seconds  Triggers: Poor sleep,   Clusters of seizures: Twitching can happen in clusters    Type 2:  Seizure type: Generalized  Warning/Aura: myoclonic twitches leading to tonic clonic   Description:    Loss of awareness: Yes  Convulsive: Yes  Post-ictal symptoms: Yes  Frequency:  not since   Duration: 1-2 minutes  Triggers: None   Clusters of seizures: None      History of status epilepticus:  No    Rescue medication: None     Epilepsy Risk Factors:  No known risk factors, including  insults, developmental delay, febrile seizures, CNS infections/strokes, head trauma. Mother had seizure at age 15 and outgrew at 21. and uncle had seizure     Age of seizure onset: age 18    Previous AED's: Levetiracetam- stopped due to mood issues    Last seizure: May 2022, twitching and last GTC 10/2018     Current AED regimen:Depakote ER 500mg BID; Briviact 100 mg BID( not taken Briviact for last 3 days)    Previous Investigations:  Routine EE2022:  Normal video EEG recording in the awake states. No persistent focal asymmetries seen. No epileptiform discharges or other epileptiform phenomena. No seizures.    Routine EE2019:  This is a normal routine EEG recording in the awake state    Routine EE2018: Per report No focal or generalized epileptiform activity noted. No regional slowing was seen during this routine study.  No clinical events or seizures were reported or recorded during the study.  The study was reported as mildly abnormal , due to delta changes in left temporal region.       CURRENT MEDICATIONS AT THE TIME OF THIS ENCOUNTER:    Current Outpatient Medications:     brivaracetam, 100 mg, Oral, BID, Taking    Se- 19, 1 Tablet, Oral, DAILY, Taking    divalproex ER, 500 mg, Oral, BID, Taking    Ashlyna, 1 Tablet, Oral, DAILY, Taking    metoprolol tartrate, 25 mg, Oral, BID, Taking     PAST MEDICAL HISTORY:  Past Medical History:   Diagnosis    Hypertension    Epilepsy        FAMILY HISTORY:  Family History   Problem Relation Age of Onset    Diabetes Mother     Sleep Apnea Mother     Heart Disease Maternal Grandmother         MIx2    Cancer Paternal Grandmother         cervical cancer    Heart Attack Paternal Grandmother         SOCIAL HISTORY:    Work at [employer]  Lives at home with daughter age 8 and her parents  1 brother and sister in good health  Social History     Socioeconomic History    Marital status: Single     Spouse name: Not on file    Number of children: Not on file    Years of education: Not on file    Highest education level: Not on file   Occupational History    Not on file   Tobacco Use    Smoking status: Every Day     Packs/day: 0.50     Years: 10.00     Pack years: 5.00     Types: Cigarettes    Smokeless tobacco: Never    Tobacco comments:     Half a pack per day    Vaping Use    Vaping Use: Never used   Substance and Sexual Activity    Alcohol use: No    Drug use: No    Sexual activity: Never     Partners: Male     Comment: None   Other Topics Concern    Not on file   Social History Narrative    Not on file     Social Determinants of Health     Financial Resource Strain: Not on file   Food  Insecurity: Not on file   Transportation Needs: Not on file   Physical Activity: Not on file   Stress: Not on file   Social Connections: Not on file   Intimate Partner Violence: Not on file   Housing Stability: Not on file       Review of systems:    All other systems were reviewed and negative other than the ones mentioned in HPI.    EXAM:   Ambulatory Vitals:  /70 (BP Location: Left arm, Patient Position: Sitting, BP Cuff Size: Adult)   Pulse 63   Temp 36.1 °C (96.9 °F) (Temporal)   Wt 112 kg (246 lb 14.6 oz)   SpO2 100%        Physical Exam:   Neurological Exam:  Higher Mental Function:   Awake, alert and oriented to place, person and time  Able to answer questions appropriately and follow commands well  Memory intact to immediate recall and remote events  Speech is clear and language fluent   Mood: affect appears normal    Cranial Nerves:  CN II: Pupils equal and reactive, no visual field deficits on confrontation  CN III,IV, VI : EOM intact with no nystagmus  CN V: Facial sensation intact  CN VII: No facial asymmetry  CN VIII: Intact hearing to conversation  CN IX, X: palate elevates symmetrically   CN XI: Symmetric shoulder shrug  CN XII: tongue midline. No signs of tongue biting or fasciculations    Motor: 5/5  strength in bilateral upper and lower extremities, No tremor at rest or on outstretched hands. Tone normal and no fasciculations  Sensory: Intact to light touch, temperature, and vibration in all 4 extremities  Reflexes: 2+ and symmetric in all 4 extremities  Coordination: Intact to finger to nose in both upper extremities, no truncal or appendicular ataxia  Gait: Normal gait, without the use of any assistance. No difficulty getting up from the chair     General:   Patient in no acute distress, pleasant and cooperative.  HEENT: Normocephalic, no signs of acute trauma.   Neck: Supple. There is normal range of motion.     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 She had previous EEG studies, which were all reported within normal limits.  She was started on Depakote and levetiracetam was added due to some of the muscle twitching without additional body convulsions.  The patient was notably acute concern for mood changes.  She continues to struggle with her mood issues with irritability. She denies any recent episodes of muscle jerking . She wanted to try and come off Briviact, due to mood issues and insurance coverage issues. We will get the Briviact covered and have her try lower doses of 50 mg BID. If she still has side effects we can wean this off and increase the Depakote dosing.  She denies any weight gain or tremors or other side effects of Depakote.  I gave her a lab to check her blood work including drug levels and vitamin D. If her episodes continues, we will also get AEEG to capture them.    Plan:  - continue AED's:  Depakote ER 500mg BID; Briviact 50 mg BID     - Discussed avoidance of spell/sz triggers: alcohol, sleep deprivation, and stress.     - Discussed Vit D supplementation. Recommended taking 2000-5000u daily. Labs ordered     - Discussed driving restrictions. No driving for 3 months following any episode concerning for seizure.      -Labs ordered    - .She is aware of the risk of pregnancy complications while taking AED's which include congenital defects, abnormal fetal growth, , etc. She is aware that OCP maybe not as effecttive in conjunction with her AED's and may need to switch AED's if she plans of getting pregnant in the future. She is also on prenatal vitamins    2. Mood disorder:   Patient continues to struggle with mood issues.  We have reduced her Briviact dosing.  It is unclear if all her issues are secondary to medication.  If she continues to have ongoing mood changes  , formal evaluation with psychiatry to be to considered.    3. Vit D deficiency  Blood work ordered. 2000 IU daily recommended,may need higher doses if deficient    She will follow up with me in 6 months. She will continue to follow-up closely with their primary care physician for other medical comorbidities.  If there are any breakthrough episodes of concerns of side effects or new symptoms, she will reach out to our clinic or seek immediate medical attention for any urgent matters. We discussed high risk with smoking and OCP use for thrombotic events.  She will also follow-up with her OB/GYN as she mentioned recent period, which is unusual for her after being on OCP.     Total time of the visit was 49 minutes including time spent in precharting, review of the previous history and test results, documentation, discussing medication safety, side effects, ordering labs, reviewing plan of care and answering patient's questions .    EDUCATION, COUNSELING:    - Education was provided to the patient and/or family regarding diagnosis and prognosis. The chronic and unpredictable nature of the condition were discussed. There is increased risk for additional events, which may carry potential for significant injuries and death. Discussed frequent seizure triggers: sleep deprivation, medication non-compliance, use of illegal drugs/alcohol, stress, and others.     - Reviewed in detail the current antiepileptic regimen. Potential side effects of antiepileptics were discussed at length, including but no limited to: hypersensitivity reactions (rash and others, some of which can be fatal), visual field changes (some of which may be irreversible), glaucoma, diplopia, kidney stones, osteopenia/osteoporosis/ bone fractures, hyperthermia/anhydrosis, hyponatremia, tremors/abnormal movements, ataxia, dizziness, fatigue, increased risk for falls, risk for cardiac arrhythmias and syncope, weight changes, hair loss, gastrointestinal side  effects(hepatitis, pancreatitis, gastritis, ulcers, gingival hypertrophy/bleeding, drowsiness, sedation, memory loss, trouble finding words, anxiety/nervousness, increased risk for suicide, increased risk for depression, and psychosis.     - Reviewed drug-drug interactions and their potential effect on seizure control and medication side effects.      -Recommend chronic vitamin D supplementation and regular exercise (if not contraindicated).     -Patient/family educated on risk for SUDEP (Sudden Death in Epilepsy). Counseling was provided on the importance of strict medication and follow up compliance. The patient/family understand the risks associated with non-adherence with the medical plan as outlined, including but not limited to an increased risk for breakthrough seizures, which may contribute to injuries, disability, status epilepticus, and even death.   -Counseling was also provided on potential effects of alcohol and other drugs, which may lower seizure threshold and/or affect the metabolism of antiepileptic drugs. We recommend avoidance of alcohol and illegal drugs and avoid sleep deprivation.     - Extensively discussed the aspects related to safety in drivers who suffer from epilepsy. The patient is encourage to report to the Division of Motor Vehicles of any condition and/or spells related to confusion, disorientation, and/or loss of awareness and/or loss of consciousness; as these may pose a safety issue if they occur while operating a motor vehicle. The patient and/or family are ultimately responsible for exercising caution and abiding to regulations in place. The patient understands that only the Department of Motor Vehicles (DMV) is able to authorize an individual to drive, even after medical clearance, DMV clearance must be obtained.     -Other seizure precautions were discussed at length, including no diving, no skydiving, no climbing or exposure to unprotected heights, no unsupervised swimming, no  [place] or bathing in bathtubs or deep bodies of water. The patient/family have been advised about risks for operating any machinery while suffering from seizures / syncope / epilepsy and/or while taking antiepileptic drugs.     -The patient understands and agrees that due to the complexity of his/her diagnosis, results of any testing and further recommendations will typically be discussed/made during a face to face encounter in office. The patient and/or family further understands it is their responsibility to keep proper follow up.      Patient/family agree with plan, as outlined.       Izaiah Beach MD, MHS  [facility]

## 2023-04-20 ENCOUNTER — TELEPHONE (OUTPATIENT)
Dept: SCHEDULING | Facility: IMAGING CENTER | Age: 38
End: 2023-04-20
Payer: MEDICAID

## 2023-04-20 NOTE — TELEPHONE ENCOUNTER
Caller: Beata Gomes    Medication Name and Dosage: brivaracetam (BRIVIACT) 50 MG Tab tablet [[id number]] -Take 1 Tablet by mouth 2 times a day    Please call your pharmacy and have them send us a refill request or speak to a live representative, RX number may have changed.    Medication amount left: 0    Preferred Pharmacy: Pharmacy on file       Other questions (Topic): n/a    Callback Number :[phone]       Thank You  May PELLETIER

## 2023-05-02 ENCOUNTER — TELEPHONE (OUTPATIENT)
Dept: NEUROLOGY | Facility: MEDICAL CENTER | Age: 38
End: 2023-05-02
Payer: MEDICAID

## 2023-05-02 NOTE — TELEPHONE ENCOUNTER
Briviact 100 MG Tablet    PA renewal called into [place] [employer] [place] [phone] Rep Mendez took information and PA approved for another year expiration date 05/09/2024. - 05/02/2023 12:07pm

## 2023-09-14 ENCOUNTER — TELEPHONE (OUTPATIENT)
Dept: SCHEDULING | Facility: IMAGING CENTER | Age: 38
End: 2023-09-14

## 2023-09-14 DIAGNOSIS — G40.909 SEIZURE DISORDER (HCC): ICD-10-CM

## 2023-09-14 NOTE — TELEPHONE ENCOUNTER
Forrest    Caller: Beata Gomes     Medication Name and Dosage:  divalproex ER (DEPAKOTE ER) 500 MG TABLET SR 24 HR [[id number]]    Medication amount left: 6 days    Preferred Pharmacy: [facility] on file    Other questions (Topic): N/A    Callback Number (Will only call for issues): [phone] (home)      Thank You    Rebecca oliveros

## 2023-09-19 ENCOUNTER — TELEPHONE (OUTPATIENT)
Dept: SCHEDULING | Facility: IMAGING CENTER | Age: 38
End: 2023-09-19

## 2023-09-19 NOTE — TELEPHONE ENCOUNTER
Good Afternoon,    This pt called back and stated she running out of her script for divalproex ER (DEPAKOTE ER) 500 MG TABLET SR 24 HR as well. She is asking for the refill as well be sent to [facility] pharmacy in addition to the note above.    Thank you,    XANDER

## 2023-09-19 NOTE — TELEPHONE ENCOUNTER
HENRIETTA    Caller: Beata Gomes     Medication Name and Dosage:  brivaracetam (BRIVIACT) 50 MG Tab tablet [[id number]]     Medication amount left: 5 DAYS    Preferred Pharmacy: [facility] on file    Other questions (Topic): N/A  Please see refill encounter on 09/14/2023- Pt was just needing an update on refill- advised 48- 72 hours    Callback Number (Will only call for issues): [phone] (home)      Thank you    Rebecca DEVLIN

## 2023-09-20 ENCOUNTER — TELEPHONE (OUTPATIENT)
Dept: NEUROLOGY | Facility: MEDICAL CENTER | Age: 38
End: 2023-09-20

## 2023-09-20 DIAGNOSIS — G40.909 SEIZURE DISORDER (HCC): ICD-10-CM

## 2023-09-20 NOTE — TELEPHONE ENCOUNTER
Briviact 50 MG Tabs    RTS - [facility] 07/19/2023 next available refill on or after 10/08/2023 +  - 09/20/2023 2:42pm

## 2023-09-20 NOTE — TELEPHONE ENCOUNTER
MD LANETTE    Caller: Beata Gomes    Topic/issue: MEDICATION MANAGEMENT     Beata states that she is needing the DEPAKOTE 50 MG. She needs it ASAP and states that she will run out soon. Please advise.    Thank you,  Cain TRUONG    Callback Number: [phone] (home)

## 2023-09-20 NOTE — TELEPHONE ENCOUNTER
Received request via: Pharmacy    Was the patient seen in the last year in this department? Yes    Does the patient have an active prescription (recently filled or refills available) for medication(s) requested? Yes.       Does the patient have snf Plus and need 100 day supply (blood pressure, diabetes and cholesterol meds only)? No      Dr. Beach patient. Patient is running out of medication please. Thank you. MARCUS Oneil.   Drysol Counseling:  I discussed with the patient the risks of drysol/aluminum chloride including but not limited to skin rash, itching, irritation, burning.

## 2023-09-20 NOTE — TELEPHONE ENCOUNTER
Beata Gomes  DEPAKOTE 500MG 2 times per day.  Last rx was 9.22.22 by Dr Oconnor and patient has requested new rx for medication see 9.14 and 9.19 notes.  They have sent wrong medication see notes.     [facility] advised since 2 days ago they do not have the new rx.   Patient has 2 days worth left and must have this medication.     Best call back [phone]

## 2023-09-21 ENCOUNTER — TELEPHONE (OUTPATIENT)
Dept: NEUROLOGY | Facility: MEDICAL CENTER | Age: 38
End: 2023-09-21
Payer: MEDICAID

## 2023-09-21 DIAGNOSIS — G40.319 INTRACTABLE GENERALIZED IDIOPATHIC EPILEPSY WITHOUT STATUS EPILEPTICUS (HCC): ICD-10-CM

## 2023-09-21 RX ORDER — DIVALPROEX SODIUM 500 MG/1
500 TABLET, EXTENDED RELEASE ORAL 2 TIMES DAILY
Qty: 180 TABLET | Refills: 1 | Status: SHIPPED | OUTPATIENT
Start: 2023-09-21 | End: 2024-03-11

## 2023-09-21 NOTE — TELEPHONE ENCOUNTER
I am covering Dr. Beach's inbox in his absence. The patient requested a refill of Depakote. I reviewed recent pertinent documentation to determine safety of refilling the medication and verified dosing. Prescription sent to patient's preferred pharmacy.

## 2024-02-12 ENCOUNTER — OFFICE VISIT (OUTPATIENT)
Dept: NEUROLOGY | Facility: MEDICAL CENTER | Age: 39
End: 2024-02-12
Attending: STUDENT IN AN ORGANIZED HEALTH CARE EDUCATION/TRAINING PROGRAM
Payer: MEDICAID

## 2024-02-12 VITALS
SYSTOLIC BLOOD PRESSURE: 118 MMHG | OXYGEN SATURATION: 100 % | WEIGHT: 247.58 LBS | HEIGHT: 68 IN | DIASTOLIC BLOOD PRESSURE: 80 MMHG | HEART RATE: 74 BPM | BODY MASS INDEX: 37.52 KG/M2 | TEMPERATURE: 97.5 F

## 2024-02-12 DIAGNOSIS — E55.9 VITAMIN D DEFICIENCY DISEASE: ICD-10-CM

## 2024-02-12 DIAGNOSIS — F32.A DEPRESSION, UNSPECIFIED DEPRESSION TYPE: ICD-10-CM

## 2024-02-12 DIAGNOSIS — G40.909 SEIZURE DISORDER (HCC): ICD-10-CM

## 2024-02-12 DIAGNOSIS — T88.7XXA MEDICATION SIDE EFFECTS: ICD-10-CM

## 2024-02-12 DIAGNOSIS — G43.E19 INTRACTABLE CHRONIC MIGRAINE WITH AURA AND WITHOUT STATUS MIGRAINOSUS: ICD-10-CM

## 2024-02-12 PROCEDURE — 99215 OFFICE O/P EST HI 40 MIN: CPT | Performed by: STUDENT IN AN ORGANIZED HEALTH CARE EDUCATION/TRAINING PROGRAM

## 2024-02-12 PROCEDURE — 99211 OFF/OP EST MAY X REQ PHY/QHP: CPT | Performed by: STUDENT IN AN ORGANIZED HEALTH CARE EDUCATION/TRAINING PROGRAM

## 2024-02-12 RX ORDER — MULTIVIT-MIN/IRON/FOLIC ACID/K 18-600-40
1 CAPSULE ORAL DAILY
Qty: 90 CAPSULE | Refills: 3 | Status: SHIPPED | OUTPATIENT
Start: 2024-02-12 | End: 2025-02-06

## 2024-02-12 RX ORDER — TOPIRAMATE 100 MG/1
TABLET, FILM COATED ORAL
Qty: 67 TABLET | Refills: 0 | Status: SHIPPED | OUTPATIENT
Start: 2024-02-12 | End: 2024-03-20

## 2024-02-12 RX ORDER — TOPIRAMATE 50 MG/1
TABLET, FILM COATED ORAL
Qty: 28 TABLET | Refills: 0 | Status: SHIPPED | OUTPATIENT
Start: 2024-02-12 | End: 2024-03-04

## 2024-02-12 ASSESSMENT — PATIENT HEALTH QUESTIONNAIRE - PHQ9: CLINICAL INTERPRETATION OF PHQ2 SCORE: 0

## 2024-02-12 NOTE — PATIENT INSTRUCTIONS
-Start Topamax 50 mg nightly for 1 week  -Increase to Topamax 50 mg twice daily for 1 week  -Increase to Topamax 50 mg in the morning and 100 mg nightly for 1 week  -Increase to Topamax 100 mg twice daily thereafter     -Once you are taking Topamax 100 mg twice daily for 3 days you can stop taking the Briviact.         Please let our office know if you have any changes in your seizure frequency and/characteristics. Otherwise, please keep the diary of your events and bring it with you at the time of your next follow up visit with our office.     Please take vitamin D3 [id number] internation units daily.     Please take folic acid 1 mg daily. This is an over-the-counter supplement that is recommended to prevent certain developmental problems in your baby, in case you become pregnant in the future.    Please let our office know as soon as you become pregnant or plan to become pregnant.    If you are caring for a baby/young child, please make sure to be sitting on a soft surface while holding your baby/young child, so in case you have a seizure, your baby/young child is not injured due to fall.     Please let us know if you observe that your baby is excessively sleepy/has other changes and the pediatrician feels that there are no other explanations except possible adverse effects of antiseizure medication(s) your are taking while nursing your baby.     Please note that the following might precipitate seizures: missed doses of antiseizure medications, being sick with fever, stress, fatigue, sleep deprivation, not eating regularly, not drinking enough water, drinking too much alcohol, stopping alcohol suddenly if you are currently using it on a regular/daily basis, and/or using recreational drugs, among others.    Please note that the following might lead to an injury, potentially a life-threatening injury, in case you have a seizure and/or lose awareness while:   - being in a large body of water by yourself, such as bath,  pool, lake, ocean, among others (risk of drowning)   - being on unprotected heights (risk of fall)   - being around and/or operating heavy machinery (risk of injury)   - being around open fire/hot surfaces (risk of burns)   - any other activities/circumstances, in which if you lose awareness, you might injure yourself and/or others.    Please call for help (crisis line and/or 911) in case you have thoughts of harming yourself and/or others.    Please abstain from driving until further notice.    ------------------------------------------------------------------------------------------  Instructions for your family/caregivers:  Please call 911 if the patient has a seizure longer than 2-3 minutes, if seizures are back to back without her recovering to her baseline, or she does not start recovering within 5-10 minutes after the seizure stops. During the seizure - please turn her on her side, please make sure her head is protected (for example, you should put a pillow under her head, if one is available), and please do not put anything in her mouth.   -------------------------------------------------------------------------------------------    It is important that your seizures are well controlled and you have none or have them rarely. In addition to avoiding injury related to breakthrough seizures, frequent seizures increase risk of SUDEP (sudden unexpected death in epilepsy), where a person goes into a seizure and then never wakes up - this is a rare complication of seizure disorder; one of the best ways to prevent it is to control your seizures well.     Due to the high volume of patients we are trying to help, your physician will not be able to respond by phone or in MyChart to your routine concerns between appointments.  This does not reflect a lack of interest or concern for you or your diagnosis.  Please bring these questions and concerns to your appointment where your physician can answer.  Please relay more  pressing concerns to our office, either via BRAINDIGI[facility]art, or by phone; if not able to reach us please visit nearby Urgent Care Center or Emergency Department.  If any emergent medical needs, please seek emergent medical help and/or call 911.    Please note that we are not able to fill out paperwork that might be related to your work, utility company, disability, and/or driving, among others, in between the visits.  Please schedule a dedicated appointment to address your paperwork, so we can do that in a timely manner.  This is not due to lack of concern or interest for your disease-related work/administrative problems, but to make sure that we provide the best possible care and to fill out your paperwork in a correct and timely manner.    Thank you for entrusting your neurological care to [facility] and we look forward to continuing to serve you.

## 2024-02-12 NOTE — PROGRESS NOTES
"[facility]  Transfer of Care    Patient name: Beata Gomes  YOB: 1985  MRN: [id number]  Date of visit: 2/12/2024     CC: Seizure      HPI:    Beata Gomes is a 38 y.o. right handed woman with a history of seizures who is being seen in transfer of care. Last seen 4/19/23 by Dr. Beach.     Onset: 18  Current treatment: Depakote ER 500mg BID; Briviact 100 mg BID   Previous treatments: Keppra - mood side effects  Status Epilepticus: no history    Seizure types and semiology:   Type I:  Seizure type: Muscle jerking  Warning/Aura: None   Description:  Random muscle jerking  Loss of awareness: No, earlier with 1-2 seconds of LOC  Convulsive: No  Post-ictal symptoms: No  Frequency:  None since May 2022, after Briviact added   Duration: twitching lasting seconds  Triggers: Poor sleep  Clusters of seizures: Twitching can happen in clusters     Type 2:  Seizure type: Generalized  Warning/Aura: myoclonic twitches leading to tonic clonic   Description:    Loss of awareness: Yes  Convulsive: Yes  Post-ictal symptoms: Yes  Frequency:  not since 2018  Duration: 1-2 minutes  Triggers: None   Clusters of seizures: None     Last seizure: twitching seizure May 2022, and last GTC 10/2018      Mood: she is having mood side effects since taking Keppra and it did not improve significantly with Briviact. She briefly took Wellbutrin after experiencing fetal loss at 32 weeks.     She has depressed mood, feels like she is in a \"dream state\" watching the world go by and not caring about what's going on. She feels anxious and on edge at baseline    Side effects: concern for side effects from Briviact. No history of suicidal ideation. .     Barriers to taking medication appropriately: no    Driving: not driving    Vitamin D: taking 1500 IU    Women's issues in epilepsy: OCP - she is not having periods any longer    Risk factors for epileptic seizures:  Normal birth history, no complications, born at " term.   No history of significant head trauma.   No history of stroke or meningitis.  (+) family history of epilepsy - mother had seizures between ages 15-21 and uncle had a seizure.    No febrile seizures.     About 3 days after taking Briviact she started having a bilateral facial rash and a rash on her abdomen. She never had a rash prior to taking Briviact. No history of mouth blisters, sometimes has them under eyelids and inside the nostril. Sometimes the nasal blisters ooze clear fluid. Before that she had very good skin with minimal acne.     She lives with her 9 year old daughter and mom and dad.     Trough depakote level 7/6/2023 was 43.4. Vitamin D level at 44.    No history of renal stones.     She stopped drinking alcohol at age 22. She smokes approximately 5 cigarettes daily which is an improvement in the amount from previously.     She has headaches with black squiggly lines and circles. She has photophobia and phonophobia. Also occasional nausea. These headaches are daily.       Past Medical History:   Past Medical History:   Diagnosis Date    Daytime sleepiness     Morning headache     Seizure (HCC)        Past Surgical History:   Past Surgical History:   Procedure Laterality Date    DILATION AND CURETTAGE  2009       Social History:   Social History     Socioeconomic History    Marital status: Single     Spouse name: Not on file    Number of children: Not on file    Years of education: Not on file    Highest education level: Not on file   Occupational History    Not on file   Tobacco Use    Smoking status: Every Day     Current packs/day: 0.50     Average packs/day: 0.5 packs/day for 10.0 years (5.0 ttl pk-yrs)     Types: Cigarettes    Smokeless tobacco: Never    Tobacco comments:     Half a pack per day    Vaping Use    Vaping Use: Never used   Substance and Sexual Activity    Alcohol use: No    Drug use: No    Sexual activity: Never     Partners: Male     Comment: None   Other Topics Concern    Not  on file   Social History Narrative    Not on file     Social Determinants of Health     Financial Resource Strain: Not on file   Food Insecurity: Not on file   Transportation Needs: Not on file   Physical Activity: Not on file   Stress: Not on file   Social Connections: Not on file   Intimate Partner Violence: Not on file   Housing Stability: Not on file       Family Hx:   Family History   Problem Relation Age of Onset    Diabetes Mother     Sleep Apnea Mother     Heart Disease Maternal Grandmother         MIx2    Cancer Paternal Grandmother         cervical cancer    Heart Attack Paternal Grandmother        Current Medications:   Current Outpatient Medications:     divalproex ER (DEPAKOTE ER) 500 MG TABLET SR 24 HR, Take 1 Tablet by mouth 2 times a day., Disp: 180 Tablet, Rfl: 1    brivaracetam (BRIVIACT) 50 MG Tab tablet, Take 1 Tablet by mouth 2 times a day., Disp: 180 Tablet, Rfl: 3    Prenatal Vit-DSS-Fe Fum-FA (SE-FANY 19) 29-1 MG Tab, Take 1 Tablet by mouth every day., Disp: , Rfl:     ASHLYNA 0.15-0.03 &0.01 MG Tab, Take 1 tablet by mouth every day., Disp: , Rfl:     metoprolol (LOPRESSOR) 25 MG Tab, Take 25 mg by mouth 2 Times a Day., Disp: , Rfl:     Allergies: No Known Allergies      Physical Exam:   Ambulatory Vitals  Vitals:    24 1328   BP: 118/80   Pulse: 74   Temp: 36.4 °C (97.5 °F)   SpO2: 100%       Constitutional: Well-developed, well-nourished, good hygiene. Appears stated age.  Respiratory: normal respiratory effort  Skin: Warm, dry, intact. No rashes observed.  Neurologic:   Mental Status: Awake, alert, oriented x 3.   Speech: Fluent with normal prosody.   Memory: Able to recall 3 words at 1 minute and 5 minutes. Able to recall recent and remote events accurately.    Concentration: Attentive. Able to focus on history and follow multi-step commands.   Fund of Knowledge: Appropriate.   Cranial Nerves:    CN II: PERRL     CN III, IV, VI: EOMI without nystagmus    CN V: Facial sensation  intact and symmetric in all 3 trigeminal distributions    CN VII: No facial asymmetry    CN VIII: Hearing intact to finger rub     CN IX and X: Palate elevates symmetrically, gag reflex not tested    CN XI: Symmetric shoulder shrug     CN XII: Tongue midline   Motor: 5/5 in upper and lower extremities bilaterally   Sensory: Intact light touch, vibration and temperature diffusely    Coordination: No evidence of past-pointing on finger to nose testing, no dysdiadochokinesia. Heel to shin intact.    DTR's: 2+ throughout without clonus.    Babinski: Toes downgoing bilaterally.   Gait: ambulates steadily without assistive device. Romberg negative. Able to perform tandem gait.    Movements: No resting tremors or abnormal movements observed.   Musculoskeletal:    Strength: as above   Tone: Normal bulk and tone   Joints: No swelling    Studies:      Labs reviewed:      Imaging:     EEG Results:   Routine EE2022:  Normal video EEG recording in the awake states. No persistent focal asymmetries seen. No epileptiform discharges or other epileptiform phenomena. No seizures.     Routine EE2019: This is a normal routine EEG recording in the awake state     Routine EE2018: Per report No focal or generalized epileptiform activity noted. No regional slowing was seen during this routine study.  No clinical events or seizures were reported or recorded during the study.  The study was reported as mildly abnormal , due to delta changes in left temporal region.     Assessment/Plan:   Beata Gomes is a 38 y.o. woman with a history of seizures being seen in transfer of care.       Generalized seizures, history of GTCs and myoclonic seizures suggestive of JOEL  -Start Topamax with uptitration as outlined  -Continue Depakote 500 mg BID  -Stop Briviact due to side effects as described in titration plan      Chronic migraine  Patient reports a history of daily headaches with intermittent scintillating scotomas, photophobia,  phonophobia, nausea. This raises concern for chronic migraine. Topamax prescribed for seizures can also be effective as a daily preventive.        Facial rash, intermittent torso rash  Given that this started 3 days after starting Briviact I am concerned this is a side effect of the medication.   -Plan to stop Briviact  -Advised patient to monitor for improvement    Depressed mood  -Plan to stop Briviact  -Referral to psychiatry placed        Patient instructions:  -Start Topamax 50 mg nightly for 1 week  -Increase to Topamax 50 mg twice daily for 1 week  -Increase to Topamax 50 mg in the morning and 100 mg nightly for 1 week  -Increase to Topamax 100 mg twice daily thereafter     -Once you are taking Topamax 100 mg twice daily for 3 days you can stop taking the Briviact.         Please let our office know if you have any changes in your seizure frequency and/characteristics. Otherwise, please keep the diary of your events and bring it with you at the time of your next follow up visit with our office.     Please take vitamin D3 1017-1280 internation units daily.     Please take folic acid 1 mg daily. This is an over-the-counter supplement that is recommended to prevent certain developmental problems in your baby, in case you become pregnant in the future.    Please let our office know as soon as you become pregnant or plan to become pregnant.    If you are caring for a baby/young child, please make sure to be sitting on a soft surface while holding your baby/young child, so in case you have a seizure, your baby/young child is not injured due to fall.     Please let us know if you observe that your baby is excessively sleepy/has other changes and the pediatrician feels that there are no other explanations except possible adverse effects of antiseizure medication(s) your are taking while nursing your baby.     Please note that the following might precipitate seizures: missed doses of antiseizure medications, being  sick with fever, stress, fatigue, sleep deprivation, not eating regularly, not drinking enough water, drinking too much alcohol, stopping alcohol suddenly if you are currently using it on a regular/daily basis, and/or using recreational drugs, among others.    Please note that the following might lead to an injury, potentially a life-threatening injury, in case you have a seizure and/or lose awareness while:   - being in a large body of water by yourself, such as bath, pool, lake, ocean, among others (risk of drowning)   - being on unprotected heights (risk of fall)   - being around and/or operating heavy machinery (risk of injury)   - being around open fire/hot surfaces (risk of burns)   - any other activities/circumstances, in which if you lose awareness, you might injure yourself and/or others.    Please call for help (crisis line and/or 911) in case you have thoughts of harming yourself and/or others.    Please abstain from driving until further notice.    ------------------------------------------------------------------------------------------  Instructions for your family/caregivers:  Please call 911 if the patient has a seizure longer than 2-3 minutes, if seizures are back to back without her recovering to her baseline, or she does not start recovering within 5-10 minutes after the seizure stops. During the seizure - please turn her on her side, please make sure her head is protected (for example, you should put a pillow under her head, if one is available), and please do not put anything in her mouth.   -------------------------------------------------------------------------------------------    It is important that your seizures are well controlled and you have none or have them rarely. In addition to avoiding injury related to breakthrough seizures, frequent seizures increase risk of SUDEP (sudden unexpected death in epilepsy), where a person goes into a seizure and then never wakes up - this is a rare  complication of seizure disorder; one of the best ways to prevent it is to control your seizures well.     Due to the high volume of patients we are trying to help, your physician will not be able to respond by phone or in MyChart to your routine concerns between appointments.  This does not reflect a lack of interest or concern for you or your diagnosis.  Please bring these questions and concerns to your appointment where your physician can answer.  Please relay more pressing concerns to our office, either via MyChart, or by phone; if not able to reach us please visit nearby Urgent Care Center or Emergency Department.  If any emergent medical needs, please seek emergent medical help and/or call 911.    Please note that we are not able to fill out paperwork that might be related to your work, utility company, disability, and/or driving, among others, in between the visits.  Please schedule a dedicated appointment to address your paperwork, so we can do that in a timely manner.  This is not due to lack of concern or interest for your disease-related work/administrative problems, but to make sure that we provide the best possible care and to fill out your paperwork in a correct and timely manner.    Thank you for entrusting your neurological care to [facility] and we look forward to continuing to serve you.           Marcia Wallace M.D.   Diplomate, Neurology with Special Qualification in Epilepsy, American Board of Psychiatry and Neurology   of Clinical Neurology, [employer]  Level III Epilepsy Center, Department of Neurology at [facility]   2/12/2024      During today's encounter we discussed available treatment options and their individual side effect profiles. Total encounter time caring for patient today 50 minutes.

## 2024-03-10 DIAGNOSIS — G40.319 INTRACTABLE GENERALIZED IDIOPATHIC EPILEPSY WITHOUT STATUS EPILEPTICUS (HCC): ICD-10-CM

## 2024-03-11 RX ORDER — DIVALPROEX SODIUM 500 MG/1
500 TABLET, EXTENDED RELEASE ORAL 2 TIMES DAILY
Qty: 180 TABLET | Refills: 0 | Status: SHIPPED | OUTPATIENT
Start: 2024-03-11

## 2024-03-11 NOTE — TELEPHONE ENCOUNTER
Received request via: Pharmacy    Medication Name/Dosage Divalproex Sodium 500MG    When was medication last prescribed 09/21/23    How many refills were previously provided 1    How many Refills does he patient have left from last prescription 0    Was the patient seen in the last year in this department? Yes   Date of last office visit 02/12/24     Per last Neurology Office Visit, when was the date of next follow up visit set for?                            Date of office visit follow up request N/A     Does the patient have an upcoming appointment? Yes   If yes, when 05/16/24             If no, schedule appointment N/A    Does the patient have shelter Plus and need 100 day supply (blood pressure, diabetes and cholesterol meds only)? Medication is not for cholesterol, blood pressure or diabetes

## 2024-03-22 ENCOUNTER — TELEPHONE (OUTPATIENT)
Dept: NEUROLOGY | Facility: MEDICAL CENTER | Age: 39
End: 2024-03-22
Payer: MEDICAID

## 2024-03-22 NOTE — TELEPHONE ENCOUNTER
03/22/24  Left patient a voicemail to see how many Topiramate the patient is taking now as the pharmacy sent us a refill on a titration. Left my direct number for her to call me back. HL

## 2024-03-25 ENCOUNTER — TELEPHONE (OUTPATIENT)
Dept: NEUROLOGY | Facility: MEDICAL CENTER | Age: 39
End: 2024-03-25
Payer: MEDICAID

## 2024-03-25 DIAGNOSIS — G40.909 SEIZURE DISORDER (HCC): ICD-10-CM

## 2024-03-25 RX ORDER — TOPIRAMATE 100 MG/1
100 TABLET, FILM COATED ORAL 2 TIMES DAILY
Qty: 180 TABLET | Refills: 3 | Status: SHIPPED | OUTPATIENT
Start: 2024-03-25 | End: 2025-03-20

## 2024-03-25 NOTE — TELEPHONE ENCOUNTER
Received Refill PA request via [facility]  for Topiramate (Topamax) 100 MG Tabs. (Quantity:180, Day Supply:90) - Copay $0.00 (No PA req'd)     [place] ([facility])  Member ID:  [id number]  BIN: [id number]  PCN: [id number]  [id number]     Ran Test claim via [place] & medication Pays for a $0.00 copay. Will outreach to patient to offer specialty pharmacy services and or release to preferred pharmacy

## 2024-03-25 NOTE — TELEPHONE ENCOUNTER
03/25/24  This patient needs a refill on her Topiramate 100MG but it has fallen off of her medication profile. Can you please send this to her pharmacy for her? Thanks Hope

## 2024-03-26 ENCOUNTER — TELEPHONE (OUTPATIENT)
Dept: NEUROLOGY | Facility: MEDICAL CENTER | Age: 39
End: 2024-03-26
Payer: MEDICAID

## 2024-03-26 NOTE — TELEPHONE ENCOUNTER
Attempted to contact patient at [phone] to discuss [facility] and services/benefits offered. No answer, left voicemail.    Suad Stevenson

## 2024-05-14 ENCOUNTER — TELEPHONE (OUTPATIENT)
Dept: NEUROLOGY | Facility: MEDICAL CENTER | Age: 39
End: 2024-05-14
Payer: MEDICAID

## 2024-05-14 NOTE — TELEPHONE ENCOUNTER
Prior Authorization for BRIVIACT TABLET 50 MG has been approved for a quantity of 180 , day supply 90    Prior Authorization reference number: [id number]  Insurance: [place]  Effective dates: 05/13/2024 thru 05/13/2025  Copay: $0.00     Is patient eligible to fill with [facility]? Yes    Next Steps: The Patients copay is less than $5.00. Will contact the patient to determine choice of pharmacy, if applicable.

## 2024-05-16 ENCOUNTER — APPOINTMENT (OUTPATIENT)
Dept: NEUROLOGY | Facility: MEDICAL CENTER | Age: 39
End: 2024-05-16
Attending: STUDENT IN AN ORGANIZED HEALTH CARE EDUCATION/TRAINING PROGRAM
Payer: MEDICAID

## 2024-06-06 DIAGNOSIS — G40.319 INTRACTABLE GENERALIZED IDIOPATHIC EPILEPSY WITHOUT STATUS EPILEPTICUS (HCC): ICD-10-CM

## 2024-06-06 RX ORDER — DIVALPROEX SODIUM 500 MG/1
500 TABLET, EXTENDED RELEASE ORAL 2 TIMES DAILY
Qty: 180 TABLET | Refills: 0 | Status: SHIPPED | OUTPATIENT
Start: 2024-06-06

## 2024-06-06 NOTE — TELEPHONE ENCOUNTER
Received request via: Pharmacy    Medication Name/Dosage  Divalproex Sodium  MG Oral Tablet Extended Release 24 Hour     When was medication last prescribed 3/11/24    How many refills were previously provided 0    How many Refills does he patient have left from last prescription 0    Was the patient seen in the last year in this department? Yes   Date of last office visit 2/12/24     Per last Neurology Office Visit, when was the date of next follow up visit set for?                            Date of office visit follow up request 4/8/24    Does the patient have an upcoming appointment? Yes   If yes, when 7/19/24             If no, schedule appointment     Does the patient have [facility] Plus and need 100 day supply (blood pressure, diabetes and cholesterol meds only)? Medication is not for cholesterol, blood pressure or diabetes

## 2024-07-18 DIAGNOSIS — G40.319 INTRACTABLE GENERALIZED IDIOPATHIC EPILEPSY WITHOUT STATUS EPILEPTICUS (HCC): ICD-10-CM

## 2024-07-19 ENCOUNTER — OFFICE VISIT (OUTPATIENT)
Dept: NEUROLOGY | Facility: MEDICAL CENTER | Age: 39
End: 2024-07-19
Attending: STUDENT IN AN ORGANIZED HEALTH CARE EDUCATION/TRAINING PROGRAM
Payer: MEDICAID

## 2024-07-19 VITALS
SYSTOLIC BLOOD PRESSURE: 114 MMHG | HEIGHT: 68 IN | HEART RATE: 59 BPM | RESPIRATION RATE: 14 BRPM | OXYGEN SATURATION: 99 % | BODY MASS INDEX: 36.99 KG/M2 | WEIGHT: 244.05 LBS | DIASTOLIC BLOOD PRESSURE: 60 MMHG

## 2024-07-19 DIAGNOSIS — G43.E19 INTRACTABLE CHRONIC MIGRAINE WITH AURA AND WITHOUT STATUS MIGRAINOSUS: ICD-10-CM

## 2024-07-19 DIAGNOSIS — G40.909 SEIZURE DISORDER (HCC): ICD-10-CM

## 2024-07-19 DIAGNOSIS — R21 FACIAL RASH: ICD-10-CM

## 2024-07-19 DIAGNOSIS — G40.319 INTRACTABLE GENERALIZED IDIOPATHIC EPILEPSY WITHOUT STATUS EPILEPTICUS (HCC): ICD-10-CM

## 2024-07-19 PROCEDURE — 99212 OFFICE O/P EST SF 10 MIN: CPT | Performed by: STUDENT IN AN ORGANIZED HEALTH CARE EDUCATION/TRAINING PROGRAM

## 2024-07-19 PROCEDURE — 3074F SYST BP LT 130 MM HG: CPT | Performed by: STUDENT IN AN ORGANIZED HEALTH CARE EDUCATION/TRAINING PROGRAM

## 2024-07-19 PROCEDURE — 3078F DIAST BP <80 MM HG: CPT | Performed by: STUDENT IN AN ORGANIZED HEALTH CARE EDUCATION/TRAINING PROGRAM

## 2024-07-19 PROCEDURE — 99213 OFFICE O/P EST LOW 20 MIN: CPT | Performed by: STUDENT IN AN ORGANIZED HEALTH CARE EDUCATION/TRAINING PROGRAM

## 2024-07-19 RX ORDER — TOPIRAMATE 50 MG/1
50 TABLET, FILM COATED ORAL 2 TIMES DAILY
Qty: 180 TABLET | Refills: 1 | Status: SHIPPED | OUTPATIENT
Start: 2024-07-19 | End: 2025-01-15

## 2024-07-19 RX ORDER — DIVALPROEX SODIUM 500 MG/1
500 TABLET, EXTENDED RELEASE ORAL 2 TIMES DAILY
Qty: 180 TABLET | Refills: 1 | Status: SHIPPED | OUTPATIENT
Start: 2024-07-19 | End: 2025-01-15

## 2024-08-07 ENCOUNTER — TELEPHONE (OUTPATIENT)
Dept: HEALTH INFORMATION MANAGEMENT | Facility: OTHER | Age: 39
End: 2024-08-07
Payer: MEDICAID

## 2024-09-05 ENCOUNTER — APPOINTMENT (OUTPATIENT)
Dept: NEUROLOGY | Facility: MEDICAL CENTER | Age: 39
End: 2024-09-05
Payer: MEDICAID

## 2025-01-12 DIAGNOSIS — G43.E19 INTRACTABLE CHRONIC MIGRAINE WITH AURA AND WITHOUT STATUS MIGRAINOSUS: ICD-10-CM

## 2025-01-12 DIAGNOSIS — G40.909 SEIZURE DISORDER (HCC): ICD-10-CM

## 2025-01-13 ENCOUNTER — HOSPITAL ENCOUNTER (OUTPATIENT)
Dept: LAB | Facility: MEDICAL CENTER | Age: 40
End: 2025-01-13
Attending: STUDENT IN AN ORGANIZED HEALTH CARE EDUCATION/TRAINING PROGRAM
Payer: COMMERCIAL

## 2025-01-13 DIAGNOSIS — G40.909 SEIZURE DISORDER (HCC): ICD-10-CM

## 2025-01-13 LAB
AMMONIA PLAS-SCNC: 32 UMOL/L (ref 11–45)
BASOPHILS # BLD AUTO: 0.6 % (ref 0–1.8)
BASOPHILS # BLD: 0.04 K/UL (ref 0–0.12)
EOSINOPHIL # BLD AUTO: 0.09 K/UL (ref 0–0.51)
EOSINOPHIL NFR BLD: 1.4 % (ref 0–6.9)
ERYTHROCYTE [DISTWIDTH] IN BLOOD BY AUTOMATED COUNT: 46.7 FL (ref 35.9–50)
HCT VFR BLD AUTO: 45.2 % (ref 37–47)
HGB BLD-MCNC: 14.5 G/DL (ref 12–16)
IMM GRANULOCYTES # BLD AUTO: 0.02 K/UL (ref 0–0.11)
IMM GRANULOCYTES NFR BLD AUTO: 0.3 % (ref 0–0.9)
LYMPHOCYTES # BLD AUTO: 2.63 K/UL (ref 1–4.8)
LYMPHOCYTES NFR BLD: 39.9 % (ref 22–41)
MCH RBC QN AUTO: 32.1 PG (ref 27–33)
MCHC RBC AUTO-ENTMCNC: 32.1 G/DL (ref 32.2–35.5)
MCV RBC AUTO: 100 FL (ref 81.4–97.8)
MONOCYTES # BLD AUTO: 0.41 K/UL (ref 0–0.85)
MONOCYTES NFR BLD AUTO: 6.2 % (ref 0–13.4)
NEUTROPHILS # BLD AUTO: 3.4 K/UL (ref 1.82–7.42)
NEUTROPHILS NFR BLD: 51.6 % (ref 44–72)
NRBC # BLD AUTO: 0 K/UL
NRBC BLD-RTO: 0 /100 WBC (ref 0–0.2)
PLATELET # BLD AUTO: 245 K/UL (ref 164–446)
PMV BLD AUTO: 10.7 FL (ref 9–12.9)
RBC # BLD AUTO: 4.52 M/UL (ref 4.2–5.4)
VALPROATE SERPL-MCNC: 42.8 UG/ML (ref 50–100)
WBC # BLD AUTO: 6.6 K/UL (ref 4.8–10.8)

## 2025-01-13 PROCEDURE — 85025 COMPLETE CBC W/AUTO DIFF WBC: CPT

## 2025-01-13 PROCEDURE — 36415 COLL VENOUS BLD VENIPUNCTURE: CPT

## 2025-01-13 PROCEDURE — 80201 ASSAY OF TOPIRAMATE: CPT

## 2025-01-13 PROCEDURE — 82140 ASSAY OF AMMONIA: CPT

## 2025-01-13 PROCEDURE — 80053 COMPREHEN METABOLIC PANEL: CPT

## 2025-01-13 PROCEDURE — 82306 VITAMIN D 25 HYDROXY: CPT

## 2025-01-13 PROCEDURE — 80164 ASSAY DIPROPYLACETIC ACD TOT: CPT

## 2025-01-14 LAB
25(OH)D3 SERPL-MCNC: 43 NG/ML (ref 30–100)
ALBUMIN SERPL BCP-MCNC: 4.5 G/DL (ref 3.2–4.9)
ALBUMIN/GLOB SERPL: 1.6 G/DL
ALP SERPL-CCNC: 62 U/L (ref 30–99)
ALT SERPL-CCNC: 20 U/L (ref 2–50)
ANION GAP SERPL CALC-SCNC: 11 MMOL/L (ref 7–16)
AST SERPL-CCNC: 26 U/L (ref 12–45)
BILIRUB SERPL-MCNC: <0.2 MG/DL (ref 0.1–1.5)
BUN SERPL-MCNC: 17 MG/DL (ref 8–22)
CALCIUM ALBUM COR SERPL-MCNC: 9 MG/DL (ref 8.5–10.5)
CALCIUM SERPL-MCNC: 9.4 MG/DL (ref 8.5–10.5)
CHLORIDE SERPL-SCNC: 110 MMOL/L (ref 96–112)
CO2 SERPL-SCNC: 20 MMOL/L (ref 20–33)
CREAT SERPL-MCNC: 0.76 MG/DL (ref 0.5–1.4)
FASTING STATUS PATIENT QL REPORTED: NORMAL
GFR SERPLBLD CREATININE-BSD FMLA CKD-EPI: 103 ML/MIN/1.73 M 2
GLOBULIN SER CALC-MCNC: 2.9 G/DL (ref 1.9–3.5)
GLUCOSE SERPL-MCNC: 97 MG/DL (ref 65–99)
POTASSIUM SERPL-SCNC: 4.1 MMOL/L (ref 3.6–5.5)
PROT SERPL-MCNC: 7.4 G/DL (ref 6–8.2)
SODIUM SERPL-SCNC: 141 MMOL/L (ref 135–145)

## 2025-01-14 RX ORDER — TOPIRAMATE 50 MG/1
TABLET, FILM COATED ORAL
Qty: 180 TABLET | Refills: 0 | Status: SHIPPED | OUTPATIENT
Start: 2025-01-14 | End: 2025-04-14

## 2025-01-14 NOTE — TELEPHONE ENCOUNTER
Received request via: Pharmacy    Medication Name/Dosage topiramate (TOPAMAX) 50 MG tablet     When was medication last prescribed 07/19/2024    How many refills were previously provided 1    How many Refills does he patient have left from last prescription 0    Was the patient seen in the last year in this department? Yes   Date of last office visit 07/19/2024     Per last Neurology Office Visit, when was the date of next follow up visit set for?                            Date of office visit follow up request 6mo     Does the patient have an upcoming appointment? No   If yes, when none             If no, schedule appointment left message with     Does the patient have [facility] and need 100 day supply (blood pressure, diabetes and cholesterol meds only)? Patient does not have SCP

## 2025-01-15 LAB — TOPIRAMATE SERPL-MCNC: 10.8 UG/ML (ref 5–20)

## 2025-01-25 DIAGNOSIS — E55.9 VITAMIN D DEFICIENCY DISEASE: ICD-10-CM

## 2025-01-27 RX ORDER — MULTIVIT-MIN/IRON/FOLIC ACID/K 18-600-40
1 CAPSULE ORAL DAILY
Qty: 90 CAPSULE | Refills: 1 | Status: SHIPPED | OUTPATIENT
Start: 2025-01-27

## 2025-01-27 NOTE — TELEPHONE ENCOUNTER
Received request via: Pharmacy    Medication Name/Dosage Cholecalciferol (VITAMIN D) 50 MCG (2000 UT)     When was medication last prescribed 02/12/2024    How many refills were previously provided 3    How many Refills does he patient have left from last prescription 0    Was the patient seen in the last year in this department? Yes   Date of last office visit 07/19/2024     Per last Neurology Office Visit, when was the date of next follow up visit set for?                            Date of office visit follow up request 6 months     Does the patient have an upcoming appointment? Yes   If yes, when 03/10/2025 With  Dr Sanches             If no, schedule appointment n/a    Does the patient have longterm Plus and need 100 day supply (blood pressure, diabetes and cholesterol meds only)? Patient does not have SCP

## 2025-02-06 ENCOUNTER — PATIENT MESSAGE (OUTPATIENT)
Dept: NEUROLOGY | Facility: MEDICAL CENTER | Age: 40
End: 2025-02-06

## 2025-02-06 ENCOUNTER — TELEMEDICINE (OUTPATIENT)
Dept: NEUROLOGY | Facility: MEDICAL CENTER | Age: 40
End: 2025-02-06
Attending: STUDENT IN AN ORGANIZED HEALTH CARE EDUCATION/TRAINING PROGRAM
Payer: COMMERCIAL

## 2025-02-06 VITALS — WEIGHT: 237 LBS | BODY MASS INDEX: 35.92 KG/M2 | HEIGHT: 68 IN

## 2025-02-06 DIAGNOSIS — T88.7XXA MEDICATION SIDE EFFECTS: ICD-10-CM

## 2025-02-06 DIAGNOSIS — R45.89 DEPRESSED MOOD: ICD-10-CM

## 2025-02-06 DIAGNOSIS — G40.909 SEIZURE DISORDER (HCC): Primary | ICD-10-CM

## 2025-02-06 DIAGNOSIS — R21 FACIAL RASH: ICD-10-CM

## 2025-02-06 DIAGNOSIS — H53.9 VISUAL DISTURBANCE: ICD-10-CM

## 2025-02-06 PROCEDURE — 99215 OFFICE O/P EST HI 40 MIN: CPT | Mod: 95 | Performed by: STUDENT IN AN ORGANIZED HEALTH CARE EDUCATION/TRAINING PROGRAM

## 2025-02-06 RX ORDER — DIVALPROEX SODIUM 250 MG/1
250 TABLET, FILM COATED, EXTENDED RELEASE ORAL DAILY
Qty: 90 TABLET | Refills: 1 | Status: SHIPPED | OUTPATIENT
Start: 2025-02-06 | End: 2025-08-05

## 2025-02-06 RX ORDER — DIVALPROEX SODIUM 500 MG/1
500 TABLET, FILM COATED, EXTENDED RELEASE ORAL 2 TIMES DAILY
COMMUNITY
Start: 2025-01-07 | End: 2025-02-06 | Stop reason: SDUPTHER

## 2025-02-06 RX ORDER — DIVALPROEX SODIUM 500 MG/1
500 TABLET, FILM COATED, EXTENDED RELEASE ORAL 2 TIMES DAILY
Qty: 180 TABLET | Refills: 1 | Status: SHIPPED | OUTPATIENT
Start: 2025-02-06 | End: 2025-08-05

## 2025-02-06 ASSESSMENT — PATIENT HEALTH QUESTIONNAIRE - PHQ9: CLINICAL INTERPRETATION OF PHQ2 SCORE: 0

## 2025-02-06 ASSESSMENT — FIBROSIS 4 INDEX: FIB4 SCORE: 0.88

## 2025-02-06 NOTE — PATIENT INSTRUCTIONS
-Increase Depakote to 750 mg BID  -Decrease Topamax to 100 mg BID for 1 week, then 50 mg BID for 1 week        Please let our office know if you have any changes in your seizure frequency and/characteristics. Otherwise, please keep the diary of your events and bring it with you at the time of your next follow up visit with our office.     Please take vitamin D3 3415-4967 international units daily.     Please take folic acid 1 mg daily. This is an over-the-counter supplement that is recommended to prevent certain developmental problems in your baby, in case you become pregnant in the future.    Please let our office know as soon as you become pregnant or plan to become pregnant.    If you are caring for a baby/young child, please make sure to be sitting on a soft surface while holding your baby/young child, so in case you have a seizure, your baby/young child is not injured due to fall.     Please let us know if you observe that your baby is excessively sleepy/has other changes and the pediatrician feels that there are no other explanations except possible adverse effects of antiseizure medication(s) your are taking while nursing your baby.     Please note that the following might precipitate seizures: missed doses of antiseizure medications, being sick with fever, stress, fatigue, sleep deprivation, not eating regularly, not drinking enough water, drinking too much alcohol, stopping alcohol suddenly if you are currently using it on a regular/daily basis, and/or using recreational drugs, among others.    Please note that the following might lead to an injury, potentially a life-threatening injury, in case you have a seizure and/or lose awareness while:   - being in a large body of water by yourself, such as bath, pool, lake, ocean, among others (risk of drowning)   - being on unprotected heights (risk of fall)   - being around and/or operating heavy machinery (risk of injury)   - being around open fire/hot surfaces  (risk of burns)   - any other activities/circumstances, in which if you lose awareness, you might injure yourself and/or others.    Please call for help (crisis line and/or 911) in case you have thoughts of harming yourself and/or others.    Please abstain from driving until further notice.    ------------------------------------------------------------------------------------------  Instructions for your family/caregivers:  Please call 911 if the patient has a seizure longer than 2-3 minutes, if seizures are back to back without her recovering to her baseline, or she does not start recovering within 5-10 minutes after the seizure stops. During the seizure - please turn her on her side, please make sure her head is protected (for example, you should put a pillow under her head, if one is available), and please do not put anything in her mouth.   -------------------------------------------------------------------------------------------    It is important that your seizures are well controlled and you have none or have them rarely. In addition to avoiding injury related to breakthrough seizures, frequent seizures increase risk of SUDEP (sudden unexpected death in epilepsy), where a person goes into a seizure and then never wakes up - this is a rare complication of seizure disorder; one of the best ways to prevent it is to control your seizures well.     Due to the high volume of patients we are trying to help, your physician will not be able to respond by phone or in MyChart to your routine concerns between appointments.  This does not reflect a lack of interest or concern for you or your diagnosis.  Please bring these questions and concerns to your appointment where your physician can answer.  Please relay more pressing concerns to our office, either via MyChart, or by phone; if not able to reach us please visit nearby Urgent Care Center or Emergency Department.  If any emergent medical needs, please seek emergent  medical help and/or call 911.    Please note that we are not able to fill out paperwork that might be related to your work, utility company, disability, and/or driving, among others, in between the visits.  Please schedule a dedicated appointment to address your paperwork, so we can do that in a timely manner.  This is not due to lack of concern or interest for your disease-related work/administrative problems, but to make sure that we provide the best possible care and to fill out your paperwork in a correct and timely manner.    Thank you for entrusting your neurological care to [facility] and we look forward to continuing to serve you.

## 2025-02-06 NOTE — PROGRESS NOTES
[facility]  Follow up visit    Patient name: Beata Gomes  YOB: 1985  MRN: [id number]  Date of visit: 2/6/2025      This visit was conducted via Teams using secure and encrypted videoconferencing technology.   The patient was located in the [facility] at their workplace.     The patient's identity was confirmed and verbal consent was obtained for this virtual visit.         Background:    Beata Gomes is a 39 y.o. 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      Seizure Onset: 18  Previous treatments: Keppra - mood side effects, Briviact - mood side effects  Status Epilepticus: no history     Seizure types and semiology:   Type I:  Seizure type: Muscle jerking  Warning/Aura: None   Description:  Random muscle jerking  Loss of awareness: No, earlier with 1-2 seconds of LOC  Convulsive: No  Post-ictal symptoms: No  Frequency:  None since May 2022, after Briviact added   Duration: twitching lasting seconds  Triggers: Poor sleep  Clusters of seizures: Twitching can happen in clusters     Type 2:  Seizure type: Generalized  Warning/Aura: myoclonic twitches leading to tonic clonic   Description:    Loss of awareness: Yes  Convulsive: Yes  Post-ictal symptoms: Yes  Frequency:  not since 2018  Duration: 1-2 minutes  Triggers: None   Clusters of seizures: None           Risk factors for epileptic seizures:  Normal birth history, no complications, born at term.   No history of significant head trauma.   No history of stroke or meningitis.  (+) family history of epilepsy - mother had seizures between ages 15-21 and uncle had a seizure.    No febrile seizures.      About 3 days after taking Briviact she started having a bilateral facial rash and a rash on her abdomen. She never had a rash prior to taking Briviact. No history of mouth blisters, sometimes has them under eyelids and inside the nostril.  "Sometimes the nasal blisters ooze clear fluid. Before that she had very good skin with minimal acne.      She lives with her 9 year old daughter and mom and dad.      Trough depakote level 7/6/2023 was 43.4. Vitamin D level at 44.     No history of renal stones.      She stopped drinking alcohol at age 22. She smokes approximately 5 cigarettes daily which is an improvement in the amount from previously.      She has headaches with black squiggly lines and circles. She has photophobia and phonophobia. Also occasional nausea. These headaches are daily.       Interval history:  Patient is unaccompanied to the visit.     She has been taking the higher dose of Topamax. She wakes up at 4 AM and goes to bed at 6pm.  She is extremely tired when she comes home from work, goes right to sleep at 6.    She has been having some memory difficulties which is new since starting the Topamax. For example, she will be out shopping and will need to check 3x if she has gotten coffee yet. She has had instances where she has forgotten how to use the coffee machine. This is all new, had not had it before starting Topamax.     She has had an improvement of the facial rash after stopping Briviact, but comes back in cold weather. She has not been able to see Dermatology due to not accepting Medicaid.     She sees \"squiggly lines\" in her vision, throughout the day. These never improve. They look like \"little worms\". The earliest she had this symptom was at age 18. It became constant in her 20s. Topamax did not help at all.  She has seen an eye doctor and she has been told her exams are normal. Triggered by fluorescent lights- works at [employer], and sunlight. The symptoms are bothersome.    Current treatment: Depakote ER 500mg BID; Topamax 150 mg BID    Last seizure: twitching seizure May 2022, and last GTC 10/2018       Mood: no issues, feels annoyed by not having energy.         2/6/2025    11:00 AM 2/12/2024     2:00 PM 4/19/2023    10:20 AM " "5/9/2022    11:00 AM 9/17/2021    10:00 AM   PHQ-9 Screening   Little interest or pleasure in doing things 0 - not at all 0 - not at all 0 - not at all 2 - more than half the days 0 - not at all   Feeling down, depressed, or hopeless 0 - not at all 0 - not at all 0 - not at all 2 - more than half the days 0 - not at all   Trouble falling or staying asleep, or sleeping too much    0 - not at all    Feeling tired or having little energy    2 - more than half the days    Poor appetite or overeating    2 - more than half the days    Feeling bad about yourself - or that you are a failure or have let yourself or your family down    2 - more than half the days    Trouble concentrating on things, such as reading the newspaper or watching television    2 - more than half the days    Moving or speaking so slowly that other people could have noticed. Or the opposite - being so fidgety or restless that you have been moving around a lot more than usual    0 - not at all    Thoughts that you would be better off dead, or of hurting yourself in some way    0 - not at all    PHQ-2 Total Score 0 0 0 4 0   PHQ-9 Total Score    12          Memory: starting to feel more forgetful of \"little things\"     Side effects: Fatigue and memory difficulty related to Topamax     Barriers to taking medication appropriately: no     Driving: not driving - does not feel comfortable driving with her history     Vitamin D: taking 2000 IU     Women's issues in epilepsy: OCP - she is not having periods any longer. She is not planning pregnancy. She is not currently sexually active.    Current Medications:   Current Outpatient Medications:     divalproex ER (DEPAKOTE ER) 500 MG TABLET SR 24 HR, Take 1 Tablet by mouth 2 times a day for 180 days., Disp: 180 Tablet, Rfl: 1    divalproex ER (DEPAKOTE ER) 250 MG TABLET SR 24 HR, Take 1 Tablet by mouth every day for 180 days., Disp: 90 Tablet, Rfl: 1    Cholecalciferol (VITAMIN D) 50 MCG (2000 UT) Cap, Take 1 " capsule by mouth once daily, Disp: 90 Capsule, Rfl: 1    Prenatal Vit-DSS-Fe Fum-FA (SE-FANY 19) 29-1 MG Tab, Take 1 Tablet by mouth every day., Disp: , Rfl:     ASHLYNA 0.15-0.03 &0.01 MG Tab, Take 1 tablet by mouth every day., Disp: , Rfl:     metoprolol (LOPRESSOR) 25 MG Tab, Take 25 mg by mouth 2 Times a Day., Disp: , Rfl:     Allergies: No Known Allergies      Physical Exam:   Ambulatory Vitals  There were no vitals filed for this visit.  Exam limited by telehealth format of visit  Constitutional: Well-developed, well-nourished, good hygiene. Appears stated age.  Respiratory: normal respiratory effort  Skin: Warm, dry, intact. No rashes observed.  Neurologic:   Mental Status: Awake, alert, oriented x 4.   Speech: Fluent with normal prosody.   Memory: Able to recall recent and remote events accurately.    Concentration: Attentive. Able to focus on history.   Fund of Knowledge: Appropriate.   Cranial Nerves:    CN II: PERRL    CN III, IV, VI: EOMI without nystagmus    CN VII: No facial asymmetry    CN VIII: Hearing intact to voice   Motor: moving all extremities fully and equally    Gait: ambulates steadily without assistive device   Movements: No resting tremors or abnormal movements observed.       Studies:      Labs reviewed:        Component  Ref Range & Units 3 wk ago   Valproic Acid  50.0 - 100.0 ug/mL 42.8 Low             Imaging: none recent    EEG Results:   Routine EE2022:  Normal video EEG recording in the awake states. No persistent focal asymmetries seen. No epileptiform discharges or other epileptiform phenomena. No seizures.     Routine EE2019: This is a normal routine EEG recording in the awake state     Routine EE2018: Per report No focal or generalized epileptiform activity noted. No regional slowing was seen during this routine study.  No clinical events or seizures were reported or recorded during the study.  The study was reported as mildly abnormal , due to delta changes  in left temporal region.        Assessment/Plan:   Beata Gomes is a 38 y.o. woman with a history of seizures being seen in follow up.    Generalized seizures, history of GTCs and myoclonic seizures suggestive of JOEL  -Wean then stop Topamax 2/2 side effects  -VPA level subtherapeutic at 42.8. Increase Depakote to 750 mg BID from 500 mg BID          Chronic migraine  Patient reports a history of daily headaches with intermittent scintillating scotomas, photophobia, phonophobia, nausea. Topamax helped with headaches but not daily visual auras, which are persistent and not intermittent. In consideration of other potential etiologies, obtain MRI of the brain to ensure stability.   -Topamax is causing memory issues/fatigue; counseled patient to wean off  -Increasing Depakote for seizures which may help with migraine  -Increase Topamax as above and continue Depakote  -Referral to headache neurology clinic for consideration of Botox        Facial rash, intermittent torso rash - ongoing  Possibly slightly better since stopping Briviact  -Referral to dermatology placed       Depressed mood  -Much improved after Briviact was started  -Referral to psychiatry placed at prior visit      Follow up in ~10 weeks.      Marcia Wallace M.D.   Diplomate, Neurology with Special Qualification in Epilepsy, American Board of Psychiatry and Neurology   of Clinical Neurology, [employer]  Level III Epilepsy Center, Department of Neurology at [facility]       During today's encounter we discussed available treatment options and their individual side effect profiles. Total encounter time caring for patient today 42 minutes.

## 2025-03-07 ENCOUNTER — OFFICE VISIT (OUTPATIENT)
Dept: URGENT CARE | Facility: PHYSICIAN GROUP | Age: 40
End: 2025-03-07
Payer: COMMERCIAL

## 2025-03-07 VITALS
SYSTOLIC BLOOD PRESSURE: 128 MMHG | OXYGEN SATURATION: 94 % | BODY MASS INDEX: 35.91 KG/M2 | WEIGHT: 236.2 LBS | TEMPERATURE: 98 F | RESPIRATION RATE: 18 BRPM | HEART RATE: 80 BPM | DIASTOLIC BLOOD PRESSURE: 80 MMHG

## 2025-03-07 DIAGNOSIS — B96.89 BACTERIAL LOWER RESPIRATORY INFECTION: ICD-10-CM

## 2025-03-07 DIAGNOSIS — J22 BACTERIAL LOWER RESPIRATORY INFECTION: ICD-10-CM

## 2025-03-07 LAB
FLUAV RNA SPEC QL NAA+PROBE: NEGATIVE
FLUBV RNA SPEC QL NAA+PROBE: NEGATIVE
RSV RNA SPEC QL NAA+PROBE: NEGATIVE
SARS-COV-2 RNA RESP QL NAA+PROBE: NEGATIVE

## 2025-03-07 PROCEDURE — 94640 AIRWAY INHALATION TREATMENT: CPT

## 2025-03-07 PROCEDURE — 3079F DIAST BP 80-89 MM HG: CPT

## 2025-03-07 PROCEDURE — 3074F SYST BP LT 130 MM HG: CPT

## 2025-03-07 PROCEDURE — 99203 OFFICE O/P NEW LOW 30 MIN: CPT | Mod: 25

## 2025-03-07 PROCEDURE — 0241U POCT CEPHEID COV-2, FLU A/B, RSV - PCR: CPT

## 2025-03-07 RX ORDER — IPRATROPIUM BROMIDE AND ALBUTEROL SULFATE 2.5; .5 MG/3ML; MG/3ML
3 SOLUTION RESPIRATORY (INHALATION) ONCE
Status: COMPLETED | OUTPATIENT
Start: 2025-03-07 | End: 2025-03-07

## 2025-03-07 RX ORDER — ALBUTEROL SULFATE 90 UG/1
2 INHALANT RESPIRATORY (INHALATION) EVERY 6 HOURS PRN
Qty: 8.5 G | Refills: 0 | Status: SHIPPED | OUTPATIENT
Start: 2025-03-07

## 2025-03-07 RX ORDER — BENZONATATE 100 MG/1
100 CAPSULE ORAL 3 TIMES DAILY PRN
Qty: 60 CAPSULE | Refills: 0 | Status: SHIPPED | OUTPATIENT
Start: 2025-03-07

## 2025-03-07 RX ORDER — DOXYCYCLINE HYCLATE 100 MG
100 TABLET ORAL 2 TIMES DAILY
Qty: 14 TABLET | Refills: 0 | Status: SHIPPED | OUTPATIENT
Start: 2025-03-07 | End: 2025-03-14

## 2025-03-07 RX ADMIN — IPRATROPIUM BROMIDE AND ALBUTEROL SULFATE 3 ML: 2.5; .5 SOLUTION RESPIRATORY (INHALATION) at 09:17

## 2025-03-07 ASSESSMENT — ENCOUNTER SYMPTOMS
COUGH: 1
FEVER: 1
SPUTUM PRODUCTION: 1
MYALGIAS: 1
CHILLS: 1

## 2025-03-07 ASSESSMENT — FIBROSIS 4 INDEX: FIB4 SCORE: 0.88

## 2025-03-07 NOTE — PROGRESS NOTES
Subjective:     CHIEF COMPLAINT  Chief Complaint   Patient presents with    Cough     Cough, mucus, fever, runny nose, X 6 days        HPI  Beata Gomes is a very pleasant 39 y.o. female who presents with a runny nose, tactile fevers, persistent cough intermittently productive of green/yellow phlegm, body aches, chills, and fatigue.  She has tried managing her symptoms with NyQuil with minimal improvement in symptoms.  Her mother is currently sick with similar symptoms.    REVIEW OF SYSTEMS  Review of Systems   Constitutional:  Positive for chills, fever and malaise/fatigue.   HENT:  Positive for congestion.    Respiratory:  Positive for cough and sputum production.    Musculoskeletal:  Positive for myalgias.       PAST MEDICAL HISTORY  Patient Active Problem List    Diagnosis Date Noted    Vitamin D deficiency disease 2023    Mood disorder (HCC) 2023    Insomnia 2021    Has daytime drowsiness 2021    At risk for osteopenia 2019    Depression 2019    Myoclonic seizure disorder (HCC) 2019    Not immune to rubella 2014    Smoker 2014    History of Fetal demise at 32 weeks 2014    Seizure disorder (HCC) 2014       SURGICAL HISTORY   has a past surgical history that includes dilation and curettage ().    ALLERGIES  No Known Allergies    CURRENT MEDICATIONS  Home Medications       Reviewed by [facility] [place] P.A.-C. (Physician Assistant) on 25 at 0906  Med List Status: <None>     Medication Last Dose Status   ASHLYNA 0.15-0.03 &0.01 MG Tab Taking Active   Cholecalciferol (VITAMIN D) 50 MCG (2000) Cap Taking Active   divalproex ER (DEPAKOTE ER) 250 MG TABLET SR 24 HR Taking Active   divalproex ER (DEPAKOTE ER) 500 MG TABLET SR 24 HR Taking Active   metoprolol (LOPRESSOR) 25 MG Tab Taking Active   Prenatal Vit-DSS-Fe Fum-FA (SE-FANY 19) 29-1 MG Tab Taking Active                    SOCIAL HISTORY  Social History     Tobacco Use     Smoking status: Former     Current packs/day: 0.50     Average packs/day: 0.5 packs/day for 10.0 years (5.0 ttl pk-yrs)     Types: Cigarettes    Smokeless tobacco: Never    Tobacco comments:     Half a pack per day    Vaping Use    Vaping status: Never Used   Substance and Sexual Activity    Alcohol use: No    Drug use: No    Sexual activity: Never     Partners: Male     Comment: None       FAMILY HISTORY  Family History   Problem Relation Age of Onset    Diabetes Mother     Sleep Apnea Mother     Heart Disease Maternal Grandmother         MIx2    Cancer Paternal Grandmother         cervical cancer    Heart Attack Paternal Grandmother           Objective:     VITAL SIGNS: /80   Pulse 80   Temp 36.7 °C (98 °F)   Resp 18   Wt 107 kg (236 lb 3.2 oz)   SpO2 94%   BMI 35.91 kg/m²     PHYSICAL EXAM  Physical Exam  Vitals reviewed.   Constitutional:       General: She is not in acute distress.     Appearance: Normal appearance. She is ill-appearing. She is not toxic-appearing.   HENT:      Head: Normocephalic and atraumatic.      Right Ear: Tympanic membrane, ear canal and external ear normal.      Left Ear: Tympanic membrane, ear canal and external ear normal.      Nose: Congestion present.      Mouth/Throat:      Mouth: Mucous membranes are moist.      Pharynx: Uvula midline. No pharyngeal swelling, oropharyngeal exudate or posterior oropharyngeal erythema.      Tonsils: No tonsillar exudate. 2+ on the right. 2+ on the left.   Eyes:      Conjunctiva/sclera: Conjunctivae normal.      Pupils: Pupils are equal, round, and reactive to light.   Cardiovascular:      Rate and Rhythm: Normal rate and regular rhythm.      Heart sounds: Normal heart sounds.   Pulmonary:      Effort: Pulmonary effort is normal. No respiratory distress.      Breath sounds: No stridor. Wheezing present. No rhonchi or rales.   Skin:     General: Skin is warm and dry.      Coloration: Skin is not pale.   Neurological:      General: No focal  deficit present.      Mental Status: She is alert and oriented to person, place, and time.   Psychiatric:         Mood and Affect: Mood normal.         Assessment/Plan:     1. Bacterial lower respiratory infection  - POCT CoV-2, Flu A/B, RSV by PCR  - ipratropium-albuterol (DUONEB) nebulizer solution  - benzonatate (TESSALON) 100 MG Cap; Take 1 Capsule by mouth 3 times a day as needed for Cough.  Dispense: 60 Capsule; Refill: 0  - albuterol 108 (90 Base) MCG/ACT Aero Soln inhalation aerosol; Inhale 2 Puffs every 6 hours as needed for Shortness of Breath.  Dispense: 8.5 g; Refill: 0  - doxycycline (VIBRAMYCIN) 100 MG Tab; Take 1 Tablet by mouth 2 times a day for 7 days.  Dispense: 14 Tablet; Refill: 0  -Rest and hydrate  -Tylenol OTC as needed for body aches and tactile fevers  -Monitor symptoms closely and return to clinic if symptoms worsen or fail to resolve  -Contingent prescription of doxycycline to be initiated if symptoms are not improving within the next 2 to 4 days        MDM/Comments:  Patient has stable vital signs and is non-toxic appearing.  Viral testing for COVID, influenza, and RSV performed in office with negative results.  Patient has been called and informed of results.  Shared decision making used and patient will be initiated on a contingent prescription of doxycycline to initiate if symptoms or not improving in the next 2 to 4 days.  Patient had diffuse wheezing on physical exam.  She has been provided a DuoNeb breathing treatment with some improvement in wheezing without full resolution upon completion.  Discussed supportive care with hydration, rest, Tylenol as needed. Patient demonstrated understanding of treatment plan at this time and will RTC if symptoms worsen or fail to resolve.     Differential diagnosis, natural history, supportive care, and indications for immediate follow-up discussed. All questions answered. Patient agrees with the plan of care.    Follow-up as needed if symptoms  worsen or fail to improve to PCP, Urgent care or Emergency Room.    I have personally reviewed prior external notes and test results pertinent to today's visit.  I have independently reviewed and interpreted all diagnostics ordered during this urgent care acute visit.   Discussed management options (risks,benefits, and alternatives to treatment). Pt expresses understanding and the treatment plan was agreed upon. Questions were encouraged and answered to pt's satisfaction.    Please note that this dictation was created using voice recognition software. I have made a reasonable attempt to correct obvious errors, but I expect that there are errors of grammar and possibly content that I did not discover before finalizing the note.

## 2025-03-07 NOTE — LETTER
March 7, 2025    To Whom It May Concern:         This is confirmation that Beata Gomes attended her appointment with [place] P.A.-C. on 3/07/25.          If you have any questions please do not hesitate to call me at the phone number listed below.    Sincerely,          [facility] [place] P.A.-C.  [phone]

## 2025-03-10 ENCOUNTER — APPOINTMENT (OUTPATIENT)
Dept: NEUROLOGY | Facility: MEDICAL CENTER | Age: 40
End: 2025-03-10
Attending: PSYCHIATRY & NEUROLOGY
Payer: COMMERCIAL

## 2025-04-11 ENCOUNTER — TELEMEDICINE (OUTPATIENT)
Dept: NEUROLOGY | Facility: MEDICAL CENTER | Age: 40
End: 2025-04-11
Attending: STUDENT IN AN ORGANIZED HEALTH CARE EDUCATION/TRAINING PROGRAM
Payer: COMMERCIAL

## 2025-04-11 VITALS — WEIGHT: 237 LBS | BODY MASS INDEX: 35.92 KG/M2 | HEIGHT: 68 IN

## 2025-04-11 DIAGNOSIS — R45.89 DEPRESSED MOOD: ICD-10-CM

## 2025-04-11 DIAGNOSIS — G40.309 GENERALIZED EPILEPSY (HCC): Primary | ICD-10-CM

## 2025-04-11 DIAGNOSIS — E55.9 VITAMIN D DEFICIENCY DISEASE: ICD-10-CM

## 2025-04-11 DIAGNOSIS — G43.E19 INTRACTABLE CHRONIC MIGRAINE WITH AURA AND WITHOUT STATUS MIGRAINOSUS: ICD-10-CM

## 2025-04-11 DIAGNOSIS — F32.A DEPRESSION, UNSPECIFIED DEPRESSION TYPE: ICD-10-CM

## 2025-04-11 DIAGNOSIS — G40.509 CATAMENIAL EPILEPSY (HCC): ICD-10-CM

## 2025-04-11 PROCEDURE — 99214 OFFICE O/P EST MOD 30 MIN: CPT | Mod: 95 | Performed by: STUDENT IN AN ORGANIZED HEALTH CARE EDUCATION/TRAINING PROGRAM

## 2025-04-11 RX ORDER — LEVONORGESTREL AND ETHINYL ESTRADIOL AND ETHINYL ESTRADIOL 150-30(84)
1 KIT ORAL DAILY
Qty: 90 TABLET | Refills: 1 | Status: SHIPPED | OUTPATIENT
Start: 2025-04-11 | End: 2025-10-08

## 2025-04-11 RX ORDER — FOLIC ACID 1 MG/1
1 TABLET ORAL DAILY
Qty: 90 TABLET | Refills: 1 | Status: SHIPPED | OUTPATIENT
Start: 2025-04-11 | End: 2025-10-08

## 2025-04-11 ASSESSMENT — FIBROSIS 4 INDEX: FIB4 SCORE: 0.88

## 2025-04-11 ASSESSMENT — PATIENT HEALTH QUESTIONNAIRE - PHQ9: CLINICAL INTERPRETATION OF PHQ2 SCORE: 0

## 2025-04-11 NOTE — PROGRESS NOTES
[facility]  Follow up visit    Patient name: Beata Gomes  YOB: 1985  MRN: [id number]  Date of visit: 4/11/2025    This visit was conducted via Teams using secure and encrypted videoconferencing technology.   The patient was located in the [facility] at their workplace.     The patient's identity was confirmed and verbal consent was obtained for this virtual visit.       Background:    Beata Gomes is a 39 y.o. 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 Last visit 2/6/2025.      Seizure Onset: 18  Previous treatments: Keppra - mood side effects, Briviact - mood side effects  Status Epilepticus: no history     Seizure types and semiology:   Type I:  Seizure type: Muscle jerking  Warning/Aura: None   Description:  Random muscle jerking  Loss of awareness: No, earlier with 1-2 seconds of LOC  Convulsive: No  Post-ictal symptoms: No  Frequency:  None since May 2022, after Briviact added   Duration: twitching lasting seconds  Triggers: Poor sleep  Clusters of seizures: Twitching can happen in clusters     Type 2:  Seizure type: Generalized  Warning/Aura: myoclonic twitches leading to tonic clonic   Description:    Loss of awareness: Yes  Convulsive: Yes  Post-ictal symptoms: Yes  Frequency:  not since 2018  Duration: 1-2 minutes  Triggers: None   Clusters of seizures: None           Risk factors for epileptic seizures:  Normal birth history, no complications, born at term.   No history of significant head trauma.   No history of stroke or meningitis.  (+) family history of epilepsy - mother had seizures between ages 15-21 and uncle had a seizure.    No febrile seizures.      About 3 days after taking Briviact she started having a bilateral facial rash and a rash on her abdomen. She never had a rash prior to taking Briviact. No history of mouth blisters, sometimes has them under eyelids and inside  the nostril. Sometimes the nasal blisters ooze clear fluid. Before that she had very good skin with minimal acne.      She lives with her 9 year old daughter and mom and dad.      Trough depakote level 7/6/2023 was 43.4. Vitamin D level at 44.     No history of renal stones.      She stopped drinking alcohol at age 22. She smokes approximately 5 cigarettes daily which is an improvement in the amount from previously.      She has headaches with black squiggly lines and circles. She has photophobia and phonophobia. Also occasional nausea. These headaches are daily.       Interval history:  Patient is unaccompanied to the visit.    She is off of Topamax.  She has had significantly less fatigue and memory difficulty. Mood has also been better. Sleep schedule is more normal. Previously she had been spending the evening sleeping, going to bed as early as 4pm. She goes to bed at 10 AM and wakes up at 6 AM.     No significant new issues regarding migraine.    Current treatment: Depakote  mg BID     Last seizure: last myoclonic seizure was in 2024, twitching seizure May 2022, and last GTC 10/2018       Mood: no issues         4/11/2025     9:00 AM 2/6/2025    11:00 AM 2/12/2024     2:00 PM 4/19/2023    10:20 AM 5/9/2022    11:00 AM   PHQ-9 Screening   Little interest or pleasure in doing things 0 - not at all 0 - not at all 0 - not at all 0 - not at all 2 - more than half the days   Feeling down, depressed, or hopeless 0 - not at all 0 - not at all 0 - not at all 0 - not at all 2 - more than half the days   Trouble falling or staying asleep, or sleeping too much     0 - not at all   Feeling tired or having little energy     2 - more than half the days   Poor appetite or overeating     2 - more than half the days   Feeling bad about yourself - or that you are a failure or have let yourself or your family down     2 - more than half the days   Trouble concentrating on things, such as reading the newspaper or watching  television     2 - more than half the days   Moving or speaking so slowly that other people could have noticed. Or the opposite - being so fidgety or restless that you have been moving around a lot more than usual     0 - not at all   Thoughts that you would be better off dead, or of hurting yourself in some way     0 - not at all   PHQ-2 Total Score 0 0 0 0 4   PHQ-9 Total Score     12         Memory: better after stopping Topamax     Side effects: none currently     Barriers to taking medication appropriately: no     Driving: not driving - does not feel comfortable driving with her history     Vitamin D: taking 2000 IU     Women's issues in epilepsy: OCP - she is not having periods any longer. She is not planning pregnancy. She is not currently sexually active. She is aware that IUD or tubal ligation are recommended and that becoming pregnant on Depakote can result in congenital defects.     Current Medications:   Current Outpatient Medications:     benzonatate (TESSALON) 100 MG Cap, Take 1 Capsule by mouth 3 times a day as needed for Cough., Disp: 60 Capsule, Rfl: 0    divalproex ER (DEPAKOTE ER) 500 MG TABLET SR 24 HR, Take 1 Tablet by mouth 2 times a day for 180 days., Disp: 180 Tablet, Rfl: 1    divalproex ER (DEPAKOTE ER) 250 MG TABLET SR 24 HR, Take 1 Tablet by mouth every day for 180 days., Disp: 90 Tablet, Rfl: 1    Cholecalciferol (VITAMIN D) 50 MCG (2000 UT) Cap, Take 1 capsule by mouth once daily, Disp: 90 Capsule, Rfl: 1    metoprolol (LOPRESSOR) 25 MG Tab, Take 25 mg by mouth 2 Times a Day., Disp: , Rfl:     albuterol 108 (90 Base) MCG/ACT Aero Soln inhalation aerosol, Inhale 2 Puffs every 6 hours as needed for Shortness of Breath., Disp: 8.5 g, Rfl: 0    Prenatal Vit-DSS-Fe Fum-FA (SE- 19) 29-1 MG Tab, Take 1 Tablet by mouth every day., Disp: , Rfl:     ASHLYNA 0.15-0.03 &0.01 MG Tab, Take 1 tablet by mouth every day. (Patient not taking: Reported on 2025), Disp: , Rfl:     Allergies: No  Known Allergies      Physical Exam:   Ambulatory Vitals  There were no vitals filed for this visit.  Exam limited by telehealth format of visit  Constitutional: Well-developed, well-nourished, good hygiene. Appears stated age.  Respiratory: normal respiratory effort  Skin: Warm, dry, intact. No rashes observed.  Neurologic:   Mental Status: Awake, alert, oriented x 4.   Speech: Fluent with normal prosody.   Memory: Able to recall recent and remote events accurately.    Concentration: Attentive. Able to focus on history.   Fund of Knowledge: Appropriate.   Cranial Nerves:    CN II: PERRL    CN III, IV, VI: EOMI without nystagmus    CN VII: No facial asymmetry    CN VIII: Hearing intact to voice   Motor: moving all extremities fully and equally    Gait: ambulates steadily without assistive device   Movements: No resting tremors or abnormal movements observed.       Studies:      Labs reviewed:    No interval labs    Imaging: none recent    EEG Results:   Routine EE2022:  Normal video EEG recording in the awake states. No persistent focal asymmetries seen. No epileptiform discharges or other epileptiform phenomena. No seizures.     Routine EE2019: This is a normal routine EEG recording in the awake state     Routine EE2018: Per report No focal or generalized epileptiform activity noted. No regional slowing was seen during this routine study.  No clinical events or seizures were reported or recorded during the study.  The study was reported as mildly abnormal , due to delta changes in left temporal region.        Assessment/Plan:   Beata Gomes is a 38 y.o. woman with a history of seizures being seen in follow up.    Generalized seizures, history of GTCs and myoclonic seizures suggestive of JOEL  Catamenial epilepsy  -Successfully weaned off of Topamax  -VPA level subtherapeutic at 42.8. Increased Depakote to 750 mg BID from 500 mg BID at last visit, no new side effects  -Refill of OCP ordered  because patient's PCP left practice  -Folate 1 mg daily ordered  -Patient is aware and was counseled that becoming pregnant on Depakote can result in congenital defects in fetus; tubal ligation or IUD recommended.       Chronic migraine  -Currently stable, can hold off on previously ordered MRI Brain  -Continue Depakote as above        Facial rash, intermittent torso rash - improving  -Referral to dermatology placed at last visit - patient needs to schedule    Vitamin D deficiency  -Continue supplementation     Depressed mood  -Improved after Topamax was weaned.   -Referral to psychiatry placed at prior visit - can hold off since mood improved after stopping Topamax.      Follow up in 3 months.     Marcia Wallace M.D.   Diplomate, Neurology with Special Qualification in Epilepsy, American Board of Psychiatry and Neurology   of Clinical Neurology, [facility] [employer]  Level III Epilepsy Center, Department of Neurology at [facility]

## 2025-08-06 DIAGNOSIS — G40.909 SEIZURE DISORDER (HCC): Primary | ICD-10-CM

## 2025-08-08 RX ORDER — DIVALPROEX SODIUM 250 MG/1
750 TABLET, FILM COATED, EXTENDED RELEASE ORAL 2 TIMES DAILY
Qty: 540 TABLET | Refills: 0 | Status: SHIPPED | OUTPATIENT
Start: 2025-08-08 | End: 2025-11-06